# Patient Record
Sex: MALE | Race: WHITE | NOT HISPANIC OR LATINO
[De-identification: names, ages, dates, MRNs, and addresses within clinical notes are randomized per-mention and may not be internally consistent; named-entity substitution may affect disease eponyms.]

---
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
## 2020-01-07 ENCOUNTER — APPOINTMENT (OUTPATIENT)
Dept: PEDIATRIC ADOLESCENT MEDICINE | Facility: CLINIC | Age: 20
End: 2020-01-07
Payer: COMMERCIAL

## 2020-01-07 PROCEDURE — 90471 IMMUNIZATION ADMIN: CPT

## 2020-01-07 PROCEDURE — 90472 IMMUNIZATION ADMIN EACH ADD: CPT

## 2020-01-07 PROCEDURE — 90651 9VHPV VACCINE 2/3 DOSE IM: CPT

## 2020-01-07 PROCEDURE — 90632 HEPA VACCINE ADULT IM: CPT

## 2020-01-07 NOTE — HISTORY OF PRESENT ILLNESS
[FreeTextEntry6] : Vicente is a 20 year old transmale here for vaccine update.\par \par Vaccines due today: HepA#2, HPV#3, flu and Bexero #1\par Vicente requested to receive only 2/4 vaccines today. Declined flu (he said he will get at local pharmacy) and Bexero\par \par Tolerated vaccines well\par Denies recent fever or illness

## 2020-01-14 ENCOUNTER — APPOINTMENT (OUTPATIENT)
Dept: PLASTIC SURGERY | Facility: CLINIC | Age: 20
End: 2020-01-14
Payer: COMMERCIAL

## 2020-01-14 PROCEDURE — 99213 OFFICE O/P EST LOW 20 MIN: CPT

## 2020-01-14 NOTE — HISTORY OF PRESENT ILLNESS
[FreeTextEntry1] : s/p revision of bilateral reconstructed breast levels left and right side DOS 12/14/18 post FTM mastectomy (transgender pt)\par Pt is doing better w/time.\par Here for follow up.

## 2020-01-21 DIAGNOSIS — Z23 ENCOUNTER FOR IMMUNIZATION: ICD-10-CM

## 2020-06-03 LAB
25(OH)D3 SERPL-MCNC: 36.1 NG/ML
ALBUMIN SERPL ELPH-MCNC: 4.7 G/DL
ALP BLD-CCNC: 98 U/L
ALT SERPL-CCNC: 14 U/L
ANION GAP SERPL CALC-SCNC: 14 MMOL/L
AST SERPL-CCNC: 18 U/L
BASOPHILS # BLD AUTO: 0.03 K/UL
BASOPHILS NFR BLD AUTO: 0.5 %
BILIRUB SERPL-MCNC: 0.4 MG/DL
BUN SERPL-MCNC: 12 MG/DL
CALCIUM SERPL-MCNC: 9.5 MG/DL
CHLORIDE SERPL-SCNC: 102 MMOL/L
CHOLEST SERPL-MCNC: 163 MG/DL
CHOLEST/HDLC SERPL: 4 RATIO
CO2 SERPL-SCNC: 26 MMOL/L
CREAT SERPL-MCNC: 1.18 MG/DL
EOSINOPHIL # BLD AUTO: 0.11 K/UL
EOSINOPHIL NFR BLD AUTO: 1.7 %
ESTRADIOL SERPL-MCNC: 62 PG/ML
GLUCOSE SERPL-MCNC: 95 MG/DL
HCT VFR BLD CALC: 50.5 %
HDLC SERPL-MCNC: 41 MG/DL
HGB BLD-MCNC: 16.1 G/DL
IMM GRANULOCYTES NFR BLD AUTO: 0.3 %
LDLC SERPL CALC-MCNC: 99 MG/DL
LYMPHOCYTES # BLD AUTO: 2.02 K/UL
LYMPHOCYTES NFR BLD AUTO: 30.9 %
MAN DIFF?: NORMAL
MCHC RBC-ENTMCNC: 28.9 PG
MCHC RBC-ENTMCNC: 31.9 GM/DL
MCV RBC AUTO: 90.5 FL
MONOCYTES # BLD AUTO: 0.8 K/UL
MONOCYTES NFR BLD AUTO: 12.2 %
NEUTROPHILS # BLD AUTO: 3.56 K/UL
NEUTROPHILS NFR BLD AUTO: 54.4 %
PLATELET # BLD AUTO: 288 K/UL
POTASSIUM SERPL-SCNC: 4.2 MMOL/L
PROT SERPL-MCNC: 6.9 G/DL
RBC # BLD: 5.58 M/UL
RBC # FLD: 12.1 %
SODIUM SERPL-SCNC: 141 MMOL/L
TESTOST SERPL-MCNC: 684 NG/DL
TRIGL SERPL-MCNC: 118 MG/DL
WBC # FLD AUTO: 6.54 K/UL

## 2020-06-05 ENCOUNTER — APPOINTMENT (OUTPATIENT)
Dept: PEDIATRIC ENDOCRINOLOGY | Facility: CLINIC | Age: 20
End: 2020-06-05
Payer: COMMERCIAL

## 2020-06-05 PROCEDURE — 99214 OFFICE O/P EST MOD 30 MIN: CPT | Mod: 95

## 2020-06-15 NOTE — HISTORY OF PRESENT ILLNESS
[Home] : at home, [unfilled] , at the time of the visit. [Medical Office: ([facility])___] : at the medical office located in  [Verbal consent obtained from patient] : the patient, [unfilled] [Polyuria] : no polyuria [Headaches] : no headaches [Polydipsia] : no polydipsia [Abdominal Pain] : no abdominal pain [Constipation] : no constipation [Fatigue] : no fatigue [Vomiting] : no vomiting [FreeTextEntry2] : Vicente is a now 20 year 11 month old transgender male who presents today for follow-up on cross sex hormone treatment with testosterone who is s/p top surgery. He has been followed by psychiatrist Dr. Greenfield and therapist Ms. Samreen Corea. I met him for the first time 7/5/2017 for a discussion. He returned 1/10/18 after he was cleared to start cross sex hormone treatment. Shot 1/25/18, gel since February 2018, and on 3 pumps daily since August 2018. He was seen again in 4/2/2019 for follow-up when he switched to injections. He was started on testosterone 100 mg every 2 weeks and dosage has been adjusted based on results. . \par Before his 12/17/2020 we increased again to 130 mg every 2 weeks of testosterone. Results then showed testosterone was still suboptimal, we increased up to 140 mg every 2 weeks\par \par Today he states he has been consistent with testosterone 140 mg every 2 weeks. He stated school year was not bad. 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 Back in Late March school closed to be remote learning and he came home. he has video calls at same time as classes are supposed to be, school year is now over. He states that this method is "not the best but not horrible". He was able to focus and finish up the school year. \par He states nothing is bothering him. He has not been shaving as much, likes how it looks. He may shave it if it becomes itchy / uncomfortable. \par Last visit he had concerns about his voice, I asked how he felt recently. He is still not comfortable, but recently when he recorded a message he realized how deep his voice sounded. If his voice does sound so deep to everyone he does think it is adequate. \par He denies any concerns. He has not had any issues with cramping or spotting. \par He is not sure about plans for school opening in the fall, he is hoping to go back physically to college. \par He states that he is not getting as much exercise for home, has been going for walks around the block, trying to be more cautious with his intake. \par \par Blood test results 6/2/2020:\par Testosterone	684.0 ng/dL		\par Triglyceride	118 mg/dL		\par Cholesterol	163 mg/dL		\par HDL Cholesterol	41 mg/dL		>=40\par LDL Cholesterol (Calculated)	99 mg/dL		<=100\par \par WBC	6.54 K/uL		3.80-10.50\par RBC Count	5.58 M/uL		4.20-5.80\par HGB	16.1 g/dL		13.0-17.0\par HCT	50.5 %	H	39.0-50.0\par Mean Cell Volume	90.5 fl		80.0-100.0\par Mean Cell Hemoglobin	28.9 pg		27.0-34.0\par Mean Cell Hemoglobin Conc	31.9 gm/dL	L	32.0-36.0\par Red Cell Distrib Width	12.1 %		10.3-14.5\par PLT	288 K/uL		150-400\par Neutrophil #	3.56 K/uL		1.80-7.40\par Lymphocyte #	2.02 K/uL		1.00-3.30\par Monocyte #	0.80 K/uL		0.00-0.90\par Eosinophil #	0.11 K/uL		0.00-0.50\par Basophil #	0.03 K/uL		0.00-0.20\par Neutrophil %	54.4 %		43.0-77.0\par Differential percentages must be correlated with absolute numbers for clinical significance.\par Lymphocyte %	30.9 %		13.0-44.0\par Monocyte %	12.2 %		2.0-14.0\par Eosinophil %	1.7 %		0.0-6.0\par Basophil %	0.5 %		0.0-2.0\par Auto Immature Granulocyte %	0.3 %		0.0-1.5\par MAN DIFF?	N/A		No\par Resulted by Discern\par \par Test Name	Result	Flag	Reference\par Sodium	141 mmol/L		135-145\par Potassium	4.2 mmol/L		3.5-5.3\par Chloride	102 mmol/L		\par CO2	26 mmol/L		22-31\par Anion Gap, Serum	14 mmol/L		5-17\par Glucose	95 mg/dL		70-99\par BUN	12 mg/dL		7-23\par Creatinine	1.18 mg/dL		0.50-1.30\par Total Protein	6.9 g/dL		6.0-8.3\par Albumin	4.7 g/dL		3.3-5.0\par Calcium, Serum	9.5 mg/dL		8.4-10.5\par Total  Bilirubin	0.4 mg/dL		0.2-1.2\par AST (SGOT)	18 U/L		10-40\par ALT(SGPT)	14 U/L		10-45\par ALK PHOS	98 U/L		\par Estradiol	62 pg/mL	H	11-43\par Vitamin D 25 Hydroxy	36.1 ng/mL		30.0-80.0\par

## 2020-06-15 NOTE — CONSULT LETTER
[Dear  ___] : Dear  [unfilled], [Courtesy Letter:] : I had the pleasure of seeing your patient, [unfilled], in my office today. [Please see my note below.] : Please see my note below. [Consult Closing:] : Thank you very much for allowing me to participate in the care of this patient.  If you have any questions, please do not hesitate to contact me. [Sincerely,] : Sincerely, [DrAsh ___] : Dr. LANDERS [FreeTextEntry3] : YeouChing Hsu, MD \par Division of Pediatric Endocrinology \par [facility] \par  of Pediatrics \par [facility] [employer] [facility] at [facility]

## 2020-06-15 NOTE — PHYSICAL EXAM
[Well Nourished] : well nourished [Obese] : obese [Interactive] : interactive [Normal] : normal [Normally Set] : normally set [de-identified] : short hair, deep voice

## 2020-07-28 ENCOUNTER — APPOINTMENT (OUTPATIENT)
Dept: PEDIATRIC ENDOCRINOLOGY | Facility: CLINIC | Age: 20
End: 2020-07-28
Payer: COMMERCIAL

## 2020-07-28 PROCEDURE — 99441: CPT

## 2020-08-05 ENCOUNTER — RX RENEWAL (OUTPATIENT)
Age: 20
End: 2020-08-05

## 2020-09-18 ENCOUNTER — APPOINTMENT (OUTPATIENT)
Dept: PEDIATRIC ENDOCRINOLOGY | Facility: CLINIC | Age: 20
End: 2020-09-18
Payer: COMMERCIAL

## 2020-09-18 DIAGNOSIS — N92.6 IRREGULAR MENSTRUATION, UNSPECIFIED: ICD-10-CM

## 2020-09-18 PROCEDURE — 99442: CPT

## 2020-09-21 PROBLEM — N92.6 IRREGULAR MENSES: Status: ACTIVE | Noted: 2020-07-29

## 2020-10-09 ENCOUNTER — APPOINTMENT (OUTPATIENT)
Dept: PEDIATRIC ENDOCRINOLOGY | Facility: CLINIC | Age: 20
End: 2020-10-09

## 2020-10-14 ENCOUNTER — APPOINTMENT (OUTPATIENT)
Dept: OBGYN | Facility: CLINIC | Age: 20
End: 2020-10-14
Payer: COMMERCIAL

## 2020-10-14 VITALS — DIASTOLIC BLOOD PRESSURE: 70 MMHG | SYSTOLIC BLOOD PRESSURE: 106 MMHG | WEIGHT: 206 LBS

## 2020-10-14 PROCEDURE — 76857 US EXAM PELVIC LIMITED: CPT

## 2020-10-14 PROCEDURE — 99213 OFFICE O/P EST LOW 20 MIN: CPT | Mod: 25

## 2020-10-16 LAB
C TRACH RRNA SPEC QL NAA+PROBE: NOT DETECTED
CANDIDA VAG CYTO: NOT DETECTED
G VAGINALIS+PREV SP MTYP VAG QL MICRO: NOT DETECTED
N GONORRHOEA RRNA SPEC QL NAA+PROBE: NOT DETECTED
SOURCE AMPLIFICATION: NORMAL
T VAGINALIS VAG QL WET PREP: NOT DETECTED

## 2020-10-20 ENCOUNTER — OUTPATIENT (OUTPATIENT)
Dept: OUTPATIENT SERVICES | Facility: HOSPITAL | Age: 20
LOS: 1 days | End: 2020-10-20
Payer: COMMERCIAL

## 2020-10-20 ENCOUNTER — LABORATORY RESULT (OUTPATIENT)
Age: 20
End: 2020-10-20

## 2020-10-20 ENCOUNTER — APPOINTMENT (OUTPATIENT)
Dept: ULTRASOUND IMAGING | Facility: CLINIC | Age: 20
End: 2020-10-20
Payer: COMMERCIAL

## 2020-10-20 DIAGNOSIS — Z00.8 ENCOUNTER FOR OTHER GENERAL EXAMINATION: ICD-10-CM

## 2020-10-20 DIAGNOSIS — Z90.13 ACQUIRED ABSENCE OF BILATERAL BREASTS AND NIPPLES: Chronic | ICD-10-CM

## 2020-10-20 PROCEDURE — 76830 TRANSVAGINAL US NON-OB: CPT

## 2020-10-20 PROCEDURE — 76856 US EXAM PELVIC COMPLETE: CPT | Mod: 26

## 2020-10-20 PROCEDURE — 76856 US EXAM PELVIC COMPLETE: CPT

## 2020-10-20 PROCEDURE — 76830 TRANSVAGINAL US NON-OB: CPT | Mod: 26,KX

## 2020-10-21 LAB — PROLACTIN SERPL-MCNC: 11 NG/ML

## 2020-10-23 ENCOUNTER — NON-APPOINTMENT (OUTPATIENT)
Age: 20
End: 2020-10-23

## 2020-10-23 LAB — TSH SERPL-ACNC: 0.01 UIU/ML

## 2020-10-27 ENCOUNTER — APPOINTMENT (OUTPATIENT)
Dept: PEDIATRIC ADOLESCENT MEDICINE | Facility: CLINIC | Age: 20
End: 2020-10-27
Payer: COMMERCIAL

## 2020-10-27 ENCOUNTER — APPOINTMENT (OUTPATIENT)
Dept: PEDIATRIC ENDOCRINOLOGY | Facility: CLINIC | Age: 20
End: 2020-10-27
Payer: COMMERCIAL

## 2020-10-27 VITALS
TEMPERATURE: 97.6 F | DIASTOLIC BLOOD PRESSURE: 80 MMHG | BODY MASS INDEX: 32.1 KG/M2 | HEART RATE: 73 BPM | WEIGHT: 192.68 LBS | HEIGHT: 64.88 IN | SYSTOLIC BLOOD PRESSURE: 114 MMHG

## 2020-10-27 VITALS — DIASTOLIC BLOOD PRESSURE: 60 MMHG | SYSTOLIC BLOOD PRESSURE: 123 MMHG | HEART RATE: 59 BPM

## 2020-10-27 DIAGNOSIS — Z23 ENCOUNTER FOR IMMUNIZATION: ICD-10-CM

## 2020-10-27 LAB
BASOPHILS # BLD AUTO: 0.02 K/UL
BASOPHILS NFR BLD AUTO: 0.3 %
EOSINOPHIL # BLD AUTO: 0.09 K/UL
EOSINOPHIL NFR BLD AUTO: 1.3 %
ESTRADIOL SERPL-MCNC: 56 PG/ML
HCT VFR BLD CALC: 48.6 %
HGB BLD-MCNC: 15.5 G/DL
IMM GRANULOCYTES NFR BLD AUTO: 0.1 %
LH SERPL-ACNC: 1.3 IU/L
LYMPHOCYTES # BLD AUTO: 2.22 K/UL
LYMPHOCYTES NFR BLD AUTO: 32.6 %
MAN DIFF?: NORMAL
MCHC RBC-ENTMCNC: 29.7 PG
MCHC RBC-ENTMCNC: 31.9 GM/DL
MCV RBC AUTO: 93.1 FL
MONOCYTES # BLD AUTO: 0.79 K/UL
MONOCYTES NFR BLD AUTO: 11.6 %
NEUTROPHILS # BLD AUTO: 3.69 K/UL
NEUTROPHILS NFR BLD AUTO: 54.1 %
PLATELET # BLD AUTO: 287 K/UL
RBC # BLD: 5.22 M/UL
RBC # FLD: 12.2 %
T3 SERPL-MCNC: 133 NG/DL
T4 SERPL-MCNC: 6.5 UG/DL
TESTOST SERPL-MCNC: 576 NG/DL
THYROGLOB AB SERPL-ACNC: <20 IU/ML
THYROPEROXIDASE AB SERPL IA-ACNC: <10 IU/ML
TSH SERPL-ACNC: 1.26 UIU/ML
WBC # FLD AUTO: 6.82 K/UL

## 2020-10-27 PROCEDURE — 90686 IIV4 VACC NO PRSV 0.5 ML IM: CPT

## 2020-10-27 PROCEDURE — 99072 ADDL SUPL MATRL&STAF TM PHE: CPT

## 2020-10-27 PROCEDURE — 99214 OFFICE O/P EST MOD 30 MIN: CPT

## 2020-10-27 PROCEDURE — 90471 IMMUNIZATION ADMIN: CPT

## 2020-11-02 DIAGNOSIS — R94.6 ABNORMAL RESULTS OF THYROID FUNCTION STUDIES: ICD-10-CM

## 2020-11-02 LAB
TSH RECEPTOR AB: <1.1 IU/L
TSI ACT/NOR SER: <0.1 IU/L

## 2020-11-24 ENCOUNTER — APPOINTMENT (OUTPATIENT)
Dept: ULTRASOUND IMAGING | Facility: CLINIC | Age: 20
End: 2020-11-24
Payer: COMMERCIAL

## 2020-11-24 ENCOUNTER — OUTPATIENT (OUTPATIENT)
Dept: OUTPATIENT SERVICES | Facility: HOSPITAL | Age: 20
LOS: 1 days | End: 2020-11-24
Payer: COMMERCIAL

## 2020-11-24 DIAGNOSIS — Z90.13 ACQUIRED ABSENCE OF BILATERAL BREASTS AND NIPPLES: Chronic | ICD-10-CM

## 2020-11-24 DIAGNOSIS — Z00.8 ENCOUNTER FOR OTHER GENERAL EXAMINATION: ICD-10-CM

## 2020-11-24 PROCEDURE — 76856 US EXAM PELVIC COMPLETE: CPT

## 2020-11-24 PROCEDURE — 76856 US EXAM PELVIC COMPLETE: CPT | Mod: 26,KX

## 2020-11-24 PROCEDURE — 76830 TRANSVAGINAL US NON-OB: CPT | Mod: 26,KX

## 2020-11-24 PROCEDURE — 76830 TRANSVAGINAL US NON-OB: CPT

## 2020-11-30 ENCOUNTER — NON-APPOINTMENT (OUTPATIENT)
Age: 20
End: 2020-11-30

## 2020-12-01 ENCOUNTER — APPOINTMENT (OUTPATIENT)
Dept: OBGYN | Facility: CLINIC | Age: 20
End: 2020-12-01
Payer: COMMERCIAL

## 2020-12-01 LAB
ESTRADIOL SERPL-MCNC: 30 PG/ML
LH SERPL-ACNC: 3.2 IU/L
TESTOST SERPL-MCNC: 385 NG/DL

## 2020-12-01 PROCEDURE — 99212 OFFICE O/P EST SF 10 MIN: CPT | Mod: 95

## 2020-12-03 DIAGNOSIS — Z20.828 CONTACT WITH AND (SUSPECTED) EXPOSURE TO OTHER VIRAL COMMUNICABLE DISEASES: ICD-10-CM

## 2020-12-07 LAB — SARS-COV-2 N GENE NPH QL NAA+PROBE: NOT DETECTED

## 2020-12-11 ENCOUNTER — APPOINTMENT (OUTPATIENT)
Dept: OBGYN | Facility: CLINIC | Age: 20
End: 2020-12-11
Payer: COMMERCIAL

## 2020-12-11 VITALS
DIASTOLIC BLOOD PRESSURE: 80 MMHG | HEIGHT: 64.88 IN | SYSTOLIC BLOOD PRESSURE: 116 MMHG | WEIGHT: 190 LBS | BODY MASS INDEX: 31.65 KG/M2

## 2020-12-11 DIAGNOSIS — Z30.430 ENCOUNTER FOR INSERTION OF INTRAUTERINE CONTRACEPTIVE DEVICE: ICD-10-CM

## 2020-12-11 DIAGNOSIS — Z30.09 ENCOUNTER FOR OTHER GENERAL COUNSELING AND ADVICE ON CONTRACEPTION: ICD-10-CM

## 2020-12-11 LAB — HCG UR QL: NEGATIVE

## 2020-12-11 PROCEDURE — 99213 OFFICE O/P EST LOW 20 MIN: CPT | Mod: 25,KX

## 2020-12-11 PROCEDURE — 99072 ADDL SUPL MATRL&STAF TM PHE: CPT

## 2020-12-11 PROCEDURE — 58300 INSERT INTRAUTERINE DEVICE: CPT

## 2020-12-11 PROCEDURE — 76857 US EXAM PELVIC LIMITED: CPT

## 2020-12-11 PROCEDURE — 81025 URINE PREGNANCY TEST: CPT

## 2020-12-13 LAB
C TRACH RRNA SPEC QL NAA+PROBE: NOT DETECTED
N GONORRHOEA RRNA SPEC QL NAA+PROBE: NOT DETECTED
SOURCE AMPLIFICATION: NORMAL

## 2020-12-14 RX ORDER — SYRINGE, DISPOSABLE, 1 ML
1 ML SYRINGE, EMPTY DISPOSABLE MISCELLANEOUS
Qty: 15 | Refills: 3 | Status: DISCONTINUED | COMMUNITY
Start: 2020-11-17 | End: 2020-12-14

## 2021-01-25 ENCOUNTER — APPOINTMENT (OUTPATIENT)
Dept: OBGYN | Facility: CLINIC | Age: 21
End: 2021-01-25
Payer: COMMERCIAL

## 2021-01-25 VITALS
WEIGHT: 194 LBS | DIASTOLIC BLOOD PRESSURE: 77 MMHG | TEMPERATURE: 97.1 F | HEIGHT: 64.88 IN | SYSTOLIC BLOOD PRESSURE: 117 MMHG | BODY MASS INDEX: 32.32 KG/M2

## 2021-01-25 DIAGNOSIS — Z01.419 ENCOUNTER FOR GYNECOLOGICAL EXAMINATION (GENERAL) (ROUTINE) W/OUT ABNORMAL FINDINGS: ICD-10-CM

## 2021-01-25 DIAGNOSIS — Z30.431 ENCOUNTER FOR ROUTINE CHECKING OF INTRAUTERINE CONTRACEPTIVE DEVICE: ICD-10-CM

## 2021-01-25 PROCEDURE — 99072 ADDL SUPL MATRL&STAF TM PHE: CPT

## 2021-01-25 PROCEDURE — 99395 PREV VISIT EST AGE 18-39: CPT | Mod: KX

## 2021-01-25 NOTE — PROCEDURE
[Locate IUD] : locate IUD [Transvaginal Ultrasound] : transvaginal ultrasound [Anteverted] : anteverted [FreeTextEntry3] : IUS in lower uterine segment, not in cervix

## 2021-01-25 NOTE — PHYSICAL EXAM
[Appropriately responsive] : appropriately responsive [Alert] : alert [No Lymphadenopathy] : no lymphadenopathy [No Acute Distress] : no acute distress [Regular Rate Rhythm] : regular rate rhythm [No Murmurs] : no murmurs [Clear to Auscultation B/L] : clear to auscultation bilaterally [Soft] : soft [Non-tender] : non-tender [Non-distended] : non-distended [No HSM] : No HSM [Oriented x3] : oriented x3 [FreeTextEntry6] : s/p top surgery -- mastectomy scar b/l [No Discharge] : no discharge [No Masses] : no breast masses were palpable [Labia Majora] : normal [Labia Minora] : normal [Enlarged] : enlarged [Atrophy] : atrophy [No Bleeding] : There was no active vaginal bleeding [IUD String] : an IUD string was noted [Normal] : normal [Anteversion] : anteverted [FreeTextEntry5] : IUS strings: 2 cm

## 2021-01-25 NOTE — HISTORY OF PRESENT ILLNESS
[FreeTextEntry1] : 20 yo G0 AFAB trans male presents for annual appointment and LNG-IUS check.  Since insertion he reports less bleeding. He is taking progesterone 2x a day.  last episode bleeding - yesterday night. \par He has had 2 doses of gardasil.  (started at age >15) \par \par remote learning\par [employer]\par painting/drawing\par \par MD - dr. jewell had appt 2/20\par endo - [facility]\par therapist - dahiana rodriguez every 2 weeks\par psych - sancho chaidez\par \par lives at home w mom, sister\par \par no sexual activity this past year

## 2021-01-25 NOTE — REVIEW OF SYSTEMS
[Fever] : no fever [Chills] : no chills [Sight Problems] : no sight problems [Dyspnea] : no dyspnea [Chest Pain] : no chest pain [Incontinence] : no incontinence [Pelvic pain] : no pelvic pain [Breast Pain] : no breast pain [Breast Lump] : no breast lump [Skin Rash] : no skin rash [Mole Changes] : no mole changes [Arthralgias] : no arthralgias [Headache] : no headache [Anxiety] : no anxiety [Depression] : no depression [Hot Flashes] : no hot flashes [Easy Bleeding] : no easy bleeding [Easy Bruising] : no easy bruising [de-identified] : stable anxiety/depression

## 2021-05-01 ENCOUNTER — NON-APPOINTMENT (OUTPATIENT)
Age: 21
End: 2021-05-01

## 2021-05-03 ENCOUNTER — NON-APPOINTMENT (OUTPATIENT)
Age: 21
End: 2021-05-03

## 2021-05-04 ENCOUNTER — APPOINTMENT (OUTPATIENT)
Dept: OBGYN | Facility: CLINIC | Age: 21
End: 2021-05-04
Payer: COMMERCIAL

## 2021-05-04 VITALS
BODY MASS INDEX: 30.32 KG/M2 | WEIGHT: 182 LBS | HEIGHT: 64.88 IN | SYSTOLIC BLOOD PRESSURE: 106 MMHG | DIASTOLIC BLOOD PRESSURE: 68 MMHG

## 2021-05-04 DIAGNOSIS — T83.9XXA UNSPECIFIED COMPLICATION OF GENITOURINARY PROSTHETIC DEVICE, IMPLANT AND GRAFT, INITIAL ENCOUNTER: ICD-10-CM

## 2021-05-04 PROCEDURE — 99214 OFFICE O/P EST MOD 30 MIN: CPT | Mod: 25

## 2021-05-04 PROCEDURE — 99072 ADDL SUPL MATRL&STAF TM PHE: CPT

## 2021-05-04 PROCEDURE — 58301 REMOVE INTRAUTERINE DEVICE: CPT

## 2021-05-04 PROCEDURE — 76830 TRANSVAGINAL US NON-OB: CPT

## 2021-05-18 ENCOUNTER — APPOINTMENT (OUTPATIENT)
Dept: OBGYN | Facility: CLINIC | Age: 21
End: 2021-05-18
Payer: COMMERCIAL

## 2021-05-18 PROCEDURE — 99072 ADDL SUPL MATRL&STAF TM PHE: CPT

## 2021-05-18 PROCEDURE — 99203 OFFICE O/P NEW LOW 30 MIN: CPT

## 2021-05-26 LAB
25(OH)D3 SERPL-MCNC: 31.8 NG/ML
ALBUMIN SERPL ELPH-MCNC: 4.8 G/DL
ALP BLD-CCNC: 84 U/L
ALT SERPL-CCNC: 13 U/L
ANION GAP SERPL CALC-SCNC: 12 MMOL/L
AST SERPL-CCNC: 15 U/L
BASOPHILS # BLD AUTO: 0.02 K/UL
BASOPHILS NFR BLD AUTO: 0.4 %
BILIRUB SERPL-MCNC: 0.7 MG/DL
BUN SERPL-MCNC: 12 MG/DL
CALCIUM SERPL-MCNC: 9.6 MG/DL
CHLORIDE SERPL-SCNC: 105 MMOL/L
CHOLEST SERPL-MCNC: 173 MG/DL
CO2 SERPL-SCNC: 26 MMOL/L
CREAT SERPL-MCNC: 1.06 MG/DL
EOSINOPHIL # BLD AUTO: 0.09 K/UL
EOSINOPHIL NFR BLD AUTO: 1.6 %
ESTIMATED AVERAGE GLUCOSE: 105 MG/DL
ESTRADIOL SERPL-MCNC: 27 PG/ML
GLUCOSE SERPL-MCNC: 91 MG/DL
HBA1C MFR BLD HPLC: 5.3 %
HCT VFR BLD CALC: 44.8 %
HDLC SERPL-MCNC: 49 MG/DL
HGB BLD-MCNC: 15.2 G/DL
IMM GRANULOCYTES NFR BLD AUTO: 0.2 %
LDLC SERPL CALC-MCNC: 111 MG/DL
LH SERPL-ACNC: 0.4 IU/L
LYMPHOCYTES # BLD AUTO: 1.58 K/UL
LYMPHOCYTES NFR BLD AUTO: 28.6 %
MAN DIFF?: NORMAL
MCHC RBC-ENTMCNC: 30.2 PG
MCHC RBC-ENTMCNC: 33.9 GM/DL
MCV RBC AUTO: 89.1 FL
MONOCYTES # BLD AUTO: 0.54 K/UL
MONOCYTES NFR BLD AUTO: 9.8 %
NEUTROPHILS # BLD AUTO: 3.28 K/UL
NEUTROPHILS NFR BLD AUTO: 59.4 %
NONHDLC SERPL-MCNC: 124 MG/DL
PLATELET # BLD AUTO: 271 K/UL
POTASSIUM SERPL-SCNC: 4.4 MMOL/L
PROT SERPL-MCNC: 6.7 G/DL
RBC # BLD: 5.03 M/UL
RBC # FLD: 11.9 %
SODIUM SERPL-SCNC: 143 MMOL/L
TESTOST SERPL-MCNC: 389 NG/DL
TRIGL SERPL-MCNC: 65 MG/DL
TSH SERPL-ACNC: 1.12 UIU/ML
WBC # FLD AUTO: 5.52 K/UL

## 2021-06-02 ENCOUNTER — APPOINTMENT (OUTPATIENT)
Dept: PEDIATRIC ENDOCRINOLOGY | Facility: CLINIC | Age: 21
End: 2021-06-02
Payer: COMMERCIAL

## 2021-06-02 VITALS
WEIGHT: 189.38 LBS | HEART RATE: 76 BPM | BODY MASS INDEX: 31.94 KG/M2 | DIASTOLIC BLOOD PRESSURE: 75 MMHG | TEMPERATURE: 98 F | HEIGHT: 64.76 IN | SYSTOLIC BLOOD PRESSURE: 121 MMHG

## 2021-06-02 PROCEDURE — 99215 OFFICE O/P EST HI 40 MIN: CPT

## 2021-06-02 PROCEDURE — 99072 ADDL SUPL MATRL&STAF TM PHE: CPT

## 2021-06-02 RX ORDER — DULOXETINE HYDROCHLORIDE 40 MG/1
40 CAPSULE, DELAYED RELEASE PELLETS ORAL
Qty: 30 | Refills: 0 | Status: DISCONTINUED | COMMUNITY
Start: 2020-12-02

## 2021-06-02 RX ORDER — SERTRALINE 25 MG/1
25 TABLET, FILM COATED ORAL
Qty: 30 | Refills: 0 | Status: DISCONTINUED | COMMUNITY
Start: 2021-03-17

## 2021-06-02 RX ORDER — BUPROPION HYDROCHLORIDE 100 MG/1
TABLET, FILM COATED ORAL
Refills: 0 | Status: ACTIVE | COMMUNITY

## 2021-06-02 RX ORDER — MEDROXYPROGESTERONE ACETATE 10 MG/1
10 TABLET ORAL
Qty: 60 | Refills: 3 | Status: DISCONTINUED | COMMUNITY
Start: 2020-07-28 | End: 2021-06-02

## 2021-06-02 RX ORDER — DULOXETINE HYDROCHLORIDE 30 MG/1
CAPSULE, DELAYED RELEASE ORAL
Refills: 0 | Status: DISCONTINUED | COMMUNITY
End: 2021-03-02

## 2021-06-02 RX ORDER — SYRINGE WITH NEEDLE, 1 ML 25GX5/8"
23G X 1" SYRINGE, EMPTY DISPOSABLE MISCELLANEOUS
Qty: 5 | Refills: 0 | Status: DISCONTINUED | COMMUNITY
Start: 2020-11-17

## 2021-06-02 NOTE — PHYSICAL EXAM
[Well Nourished] : well nourished [Interactive] : interactive [Obese] : obese [Normal] : normal [Normally Set] : normally set [de-identified] : short hair, deep voice

## 2021-06-02 NOTE — HISTORY OF PRESENT ILLNESS
[Home] : at home, [unfilled] , at the time of the visit. [Medical Office: ([facility])___] : at the medical office located in  [Verbal consent obtained from patient] : the patient, [unfilled] [Headaches] : no headaches [Polyuria] : no polyuria [Polydipsia] : no polydipsia [Constipation] : no constipation [Fatigue] : no fatigue [Abdominal Pain] : no abdominal pain [Vomiting] : no vomiting [FreeTextEntry2] : Vicente is a now 20 year 9 month old transgender male who presents today for follow-up on cross sex hormone treatment with testosterone who is s/p top surgery and recently has had issues with irregular uterine bleeding. He has been followed by psychiatrist Dr. Greenfield and therapist Ms. Samreen Corea. I met him for the first time 7/5/2017 for a discussion he decided not to opt for hormone suppression treatment. He returned 1/10/18 after he was cleared to start cross sex hormone treatment. Shot 1/25/18, gel since February 2018 worked up to full dosage. He was seen again in 4/2/2019 for follow-up when he switched to injections. He was started on testosterone 100 mg every 2 weeks and dosage has been adjusted based on results. . \par Before his 12/17/2020 we increased again to 130 mg every 2 weeks of testosterone. Results then showed testosterone was still suboptimal, we increased up to 140 mg every 2 weeks. \par I last saw him 6/5/2020 for telehealth follow-up the second half of the semester was remote which was not good but not terrible either. He was hoping for live school in the fall. He states he was able to focus better after and finished up the school year. Results showed that unfortunately estradiol was again on the higher side in the 60’s thus we recommended decreasing back to 130 mg every 2 weeks. \par Unfortunately since then he has had very irregular bleeding he first contacted me 7/28/2020 he has had light spotting since 3 weeks before, first I recommended trying to do 70 mg every week of testosterone. I recommended also starting Provera 10 mg for 1 week. With this, menses unfortunately started August 7th and has not stopped when he contacted me 8/26/2020. The amount is not a large amount but he changed pads 2-3 times a day. I recommended restarting Provera again for 1 week and then he felt bleeding was almost gone I recommended lower to 1/2 tablet. When he contacted me 9/18 unfortunately it had gotten heavier. Trying to determine what may be more of an issue, potentially due to poor suppression of HPG axis, I increased him to 1 tablet of Provera every day and also 75 mg every week of testosterone. With this unfortunately it has not stopped, thus we increased to twice a day of Provera since 9/30/2020. This visit is plan for follow up and also to follow-up on the menses. \par We have discussed combined OCP which he does not want to do, and we have also reviewed possibility of considering IUD. \par He did see Dr. Martins 10/14/2020 last week and had blood work and U/S done. With results TSH was unexpectedly very low. He does not take testosterone at the same time of the day she recommended trying to be consistent with it as well. As I was requesting results to be done over the weekend I added thyroid studies and antibodies to check for autoimmune thyroid disease as well as Graves’ disease.\par Today Vicente states that physically, he has been okay. Since progesterone was increased to twice a day the bleeding is still there and it is on the light side, but still coming regularly. He is doing testosterone every Tuesday never missed / a day late but may be afternoon vs. night time. He is actually working 35 hours a week at [employer] right now. It has not been stressful for the season, there is a transfemale at work there as well. \par Emotionally he felt this has been a big toll on him. He has been talking with his therapist Ms. Samreen Corea as before and continues to work well with her, his psychiatrist is still Dr. Greenfield. He is consistent with his medications including his vitamin D. \par He has lost about 15 lbs in the last 2 months because he does not drive so he has been walking to and from work if not dropped off by someone. He is also not snacking as much. He is happy about the weight loss knows he has not weighed this much for a long time. \par I commented his voice is nice a deep and he states particularly on the phone he does try to use deeper voice. He is shaving regularly.

## 2021-06-02 NOTE — CONSULT LETTER
[Dear  ___] : Dear  [unfilled], [Courtesy Letter:] : I had the pleasure of seeing your patient, [unfilled], in my office today. [Please see my note below.] : Please see my note below. [Consult Closing:] : Thank you very much for allowing me to participate in the care of this patient.  If you have any questions, please do not hesitate to contact me. [Sincerely,] : Sincerely, [DrAsh ___] : Dr. LANDERS [DrAsh  ___] : Dr. LANDERS [FreeTextEntry3] : YeouChing Hsu, MD \par Division of Pediatric Endocrinology \par [facility] \par  of Pediatrics \par [facility] [employer] [facility]

## 2021-06-09 NOTE — HISTORY OF PRESENT ILLNESS
[Headaches] : no headaches [Polyuria] : no polyuria [Polydipsia] : no polydipsia [Constipation] : no constipation [Fatigue] : no fatigue [Abdominal Pain] : no abdominal pain [Vomiting] : no vomiting [FreeTextEntry2] : Vicente is a now 21 year 4 month old transgender male who presents today for follow-up on cross sex hormone treatment with testosterone who is s/p top surgery and recently has had issues with irregular uterine bleeding. He has been followed by psychiatrist Dr. Greenfield and therapist Ms. Samreen Corea. I met him for the first time 7/5/2017 for a discussion he decided not to opt for hormone suppression treatment. He returned 1/10/18 after he was cleared to start cross sex hormone treatment. Shot 1/25/18, gel since February 2018 worked up to full dosage. He was seen again in 4/2/2019 for follow-up when he switched to injections. He was started on testosterone 100 mg every 2 weeks and dosage has been adjusted based on results. . \par Before his 12/17/2020 we increased again to 130 mg every 2 weeks of testosterone. Results then showed testosterone was still suboptimal, we increased up to 140 mg every 2 weeks. \par I last saw him 6/5/2020 for telehealth follow-up the second half of the semester was remote which was not good but not terrible either. He was hoping for live school in the fall. He states he was able to focus better after and finished up the school year. Results showed that unfortunately estradiol was again on the higher side in the 60’s thus we recommended decreasing back to 130 mg every 2 weeks. \par Unfortunately since then he has had very irregular bleeding he first contacted me 7/28/2020 he has had light spotting since 3 weeks before, first I recommended trying to do 70 mg every week of testosterone. I recommended also starting Provera 10 mg for 1 week. With this, menses unfortunately started August 7th and has not stopped when he contacted me 8/26/2020. The amount is not a large amount but he changed pads 2-3 times a day. I recommended restarting Provera again for 1 week and then he felt bleeding was almost gone I recommended lower to 1/2 tablet. When he contacted me 9/18 unfortunately it had gotten heavier. Trying to determine what may be more of an issue, potentially due to poor suppression of HPG axis, I increased him to 1 tablet of Provera every day and also 75 mg every week of testosterone. With this unfortunately it has not stopped, thus we increased to twice a day of Provera since 9/30/2020. This visit is plan for follow up and also to follow-up on the menses. We have discussed combined OCP which he does not want to do, and we have also reviewed possibility of considering IUD. He did see Dr. Martins 10/14/2020 and had U/S done. After much discussion he decided on Mirena IUD. Unfortunately this has not helped with his vaginal bleeding, and on 5/4/2021 he saw Dr. Martins where IUD was not in place and had to be taken out. Vicente contacted me before the visit that he was interested in hysterectomy and we reviewed we will discuss at the visit. \par \par Vicente states that he is well. He is having less bleeding lately, but still there is some bleeding and pain with cramping. He has been consistent with his testosterone, has not missed any dosage he stated always Tuesday and Wednesdays. He stated he has given significant thought, spoke with Dr. Martins interested in initially he thought the ovaries removed and Dr. Martins discussed hysterectomy would be best. He went to see Dr. Savannah Osorio on May 18th. Booked surgery for August 5th. They have discussed laparoscopic hysterectomy and Vicente's understanding is that cervix would also be removed. \par he knows his weight had fluctuated, he had lost weight but then gained, and then gained back, when working highest 206 down to 192 lb, thought maybe was 202 lb with Dr. Martins, and thought he lost a lot of weight since and thus wanted to also discuss this. \par He voiced without mother present he is very sure about not wanting to have kids at all. He felt if he really does change his mind, he would not have any problem adopting. For sure he does not want to carry children he is positive. In the future when he has a partner he does feel child does not need to be related to partner either. \par Vicente is not seeing anyone now. no specific gender attraction. 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 He has decided that he will go for a masters in art therapy.

## 2021-06-09 NOTE — PHYSICAL EXAM
[Well Nourished] : well nourished [Interactive] : interactive [Obese] : obese [Normal] : normal [Normally Set] : normally set [de-identified] : short hair, deep voice

## 2021-06-09 NOTE — CONSULT LETTER
[Dear  ___] : Dear  [unfilled], [Courtesy Letter:] : I had the pleasure of seeing your patient, [unfilled], in my office today. [Please see my note below.] : Please see my note below. [Consult Closing:] : Thank you very much for allowing me to participate in the care of this patient.  If you have any questions, please do not hesitate to contact me. [Sincerely,] : Sincerely, [DrAsh  ___] : Dr. LANDERS [DrAsh ___] : Dr. LANDERS [FreeTextEntry3] : YeouChing Hsu, MD \par Division of Pediatric Endocrinology \par [facility] \par  of Pediatrics \par [facility]

## 2021-07-26 ENCOUNTER — OUTPATIENT (OUTPATIENT)
Dept: OUTPATIENT SERVICES | Facility: HOSPITAL | Age: 21
LOS: 1 days | End: 2021-07-26
Payer: COMMERCIAL

## 2021-07-26 VITALS — WEIGHT: 186.07 LBS | HEIGHT: 65 IN

## 2021-07-26 DIAGNOSIS — F64.1 DUAL ROLE TRANSVESTISM: ICD-10-CM

## 2021-07-26 DIAGNOSIS — Z86.59 PERSONAL HISTORY OF OTHER MENTAL AND BEHAVIORAL DISORDERS: ICD-10-CM

## 2021-07-26 DIAGNOSIS — Z01.818 ENCOUNTER FOR OTHER PREPROCEDURAL EXAMINATION: ICD-10-CM

## 2021-07-26 DIAGNOSIS — Z90.13 ACQUIRED ABSENCE OF BILATERAL BREASTS AND NIPPLES: Chronic | ICD-10-CM

## 2021-07-26 DIAGNOSIS — K08.409 PARTIAL LOSS OF TEETH, UNSPECIFIED CAUSE, UNSPECIFIED CLASS: Chronic | ICD-10-CM

## 2021-07-26 LAB
ANION GAP SERPL CALC-SCNC: 12 MMOL/L — SIGNIFICANT CHANGE UP (ref 5–17)
BLD GP AB SCN SERPL QL: NEGATIVE — SIGNIFICANT CHANGE UP
BUN SERPL-MCNC: 10 MG/DL — SIGNIFICANT CHANGE UP (ref 7–23)
CALCIUM SERPL-MCNC: 9.8 MG/DL — SIGNIFICANT CHANGE UP (ref 8.4–10.5)
CHLORIDE SERPL-SCNC: 101 MMOL/L — SIGNIFICANT CHANGE UP (ref 96–108)
CO2 SERPL-SCNC: 25 MMOL/L — SIGNIFICANT CHANGE UP (ref 22–31)
CREAT SERPL-MCNC: 1 MG/DL — SIGNIFICANT CHANGE UP (ref 0.5–1.3)
GLUCOSE SERPL-MCNC: 82 MG/DL — SIGNIFICANT CHANGE UP (ref 70–99)
HCT VFR BLD CALC: 48.2 % — SIGNIFICANT CHANGE UP (ref 39–50)
HGB BLD-MCNC: 15.6 G/DL — SIGNIFICANT CHANGE UP (ref 13–17)
MCHC RBC-ENTMCNC: 29.2 PG — SIGNIFICANT CHANGE UP (ref 27–34)
MCHC RBC-ENTMCNC: 32.4 GM/DL — SIGNIFICANT CHANGE UP (ref 32–36)
MCV RBC AUTO: 90.1 FL — SIGNIFICANT CHANGE UP (ref 80–100)
NRBC # BLD: 0 /100 WBCS — SIGNIFICANT CHANGE UP (ref 0–0)
PLATELET # BLD AUTO: 313 K/UL — SIGNIFICANT CHANGE UP (ref 150–400)
POTASSIUM SERPL-MCNC: 3.8 MMOL/L — SIGNIFICANT CHANGE UP (ref 3.5–5.3)
POTASSIUM SERPL-SCNC: 3.8 MMOL/L — SIGNIFICANT CHANGE UP (ref 3.5–5.3)
RBC # BLD: 5.35 M/UL — SIGNIFICANT CHANGE UP (ref 4.2–5.8)
RBC # FLD: 12.1 % — SIGNIFICANT CHANGE UP (ref 10.3–14.5)
RH IG SCN BLD-IMP: POSITIVE — SIGNIFICANT CHANGE UP
SODIUM SERPL-SCNC: 138 MMOL/L — SIGNIFICANT CHANGE UP (ref 135–145)
WBC # BLD: 7.41 K/UL — SIGNIFICANT CHANGE UP (ref 3.8–10.5)
WBC # FLD AUTO: 7.41 K/UL — SIGNIFICANT CHANGE UP (ref 3.8–10.5)

## 2021-07-26 PROCEDURE — 83036 HEMOGLOBIN GLYCOSYLATED A1C: CPT

## 2021-07-26 PROCEDURE — 85027 COMPLETE CBC AUTOMATED: CPT

## 2021-07-26 PROCEDURE — G0463: CPT

## 2021-07-26 PROCEDURE — 86901 BLOOD TYPING SEROLOGIC RH(D): CPT

## 2021-07-26 PROCEDURE — 87086 URINE CULTURE/COLONY COUNT: CPT

## 2021-07-26 PROCEDURE — 86850 RBC ANTIBODY SCREEN: CPT

## 2021-07-26 PROCEDURE — 86900 BLOOD TYPING SEROLOGIC ABO: CPT

## 2021-07-26 PROCEDURE — 80048 BASIC METABOLIC PNL TOTAL CA: CPT

## 2021-07-26 RX ORDER — LIDOCAINE HCL 20 MG/ML
0.2 VIAL (ML) INJECTION ONCE
Refills: 0 | Status: DISCONTINUED | OUTPATIENT
Start: 2021-08-05 | End: 2021-08-19

## 2021-07-26 RX ORDER — SODIUM CHLORIDE 9 MG/ML
3 INJECTION INTRAMUSCULAR; INTRAVENOUS; SUBCUTANEOUS EVERY 8 HOURS
Refills: 0 | Status: DISCONTINUED | OUTPATIENT
Start: 2021-08-05 | End: 2021-08-19

## 2021-07-26 RX ORDER — CHOLECALCIFEROL (VITAMIN D3) 125 MCG
1 CAPSULE ORAL
Qty: 0 | Refills: 0 | DISCHARGE

## 2021-07-26 RX ORDER — CEFOTETAN DISODIUM 1 G
2 VIAL (EA) INJECTION ONCE
Refills: 0 | Status: DISCONTINUED | OUTPATIENT
Start: 2021-08-05 | End: 2021-08-19

## 2021-07-26 RX ORDER — DULOXETINE HYDROCHLORIDE 30 MG/1
1 CAPSULE, DELAYED RELEASE ORAL
Qty: 0 | Refills: 0 | DISCHARGE

## 2021-07-26 NOTE — H&P PST ADULT - NSICDXPASTMEDICALHX_GEN_ALL_CORE_FT
PAST MEDICAL HISTORY:  Anxiety     Depression     Transgender female : male   pt started hormone therapy 1/ 18 ; rx androgel     PAST MEDICAL HISTORY:  Abnormal uterine bleeding pt is on testosterone, pt gets intermittent uterine bleeding.    Anxiety     Depression     Transgender female : male   pt started hormone therapy 1/ 18 ; rx androgel

## 2021-07-26 NOTE — H&P PST ADULT - ATTENDING COMMENTS
Patient seen at bedside, with gender dysphoria plan for TLHBSO, cystoscopy. Discussed risks of procedure including but not limited to VTE, SSI, damage to bowel, bladder, need for laparotomy. Reviewed post operative care, instructions. Understands learners are part of his case and performing EUA. All questions answered at length     Savannah Osorio MD

## 2021-07-26 NOTE — H&P PST ADULT - NSICDXPROBLEM_GEN_ALL_CORE_FT
PROBLEM DIAGNOSES  Problem: H/O gender identity disorder  Assessment and Plan: scheduled for total laproscopic hystrectomy, b/l salpingo oophrectomy, cystourethroscopy on 08/05/21   preop instructions done  cbc,bmp, hemoglobin AIC,type and screen, urine culture, serum HCG         PROBLEM DIAGNOSES  Problem: H/O gender identity disorder  Assessment and Plan: scheduled for total laproscopic hystrectomy, b/l salpingo oophrectomy, cystourethroscopy on 08/05/21   preop instructions done  cbc,bmp, hemoglobin AIC,type and screen, urine culture sent   pt can take wellbutrin in the am of 08/05/21 with sips of water.

## 2021-07-26 NOTE — H&P PST ADULT - NSICDXPASTSURGICALHX_GEN_ALL_CORE_FT
PAST SURGICAL HISTORY:  S/P mastectomy, bilateral 4/2018/ revision of mastectomy in 12/2018    [place] teeth removed 06/2019

## 2021-07-26 NOTE — H&P PST ADULT - NSICDXFAMILYHX_GEN_ALL_CORE_FT
FAMILY HISTORY:  Grandparent  Still living? Unknown  Family history of breast cancer, Age at diagnosis: Age Unknown    Aunt  Still living? Unknown  Family history of breast cancer, Age at diagnosis: Age Unknown

## 2021-07-26 NOTE — H&P PST ADULT - ACTIVITY
H & P


Time Seen by Provider: 03/06/18 17:10


HPI/ROS: 





CHIEF COMPLAINT:  Neck injury neck pain





HISTORY OF PRESENT ILLNESS: obtained from child, parent, sibling.  Child today 

was hanging from the playground horizontal bar by her knees when she lost 

contact and fell on the ground landing on her head with forward flexion of her 

neck.  No loss of consciousness, sibling who was there confirmed she never was 

unconscious or asleep.  Complain of neck pain, went to urgent care, said she 

was having a little bit of difficulty"carrying things"in her arms but now has 

normal strength and sensation in all 4 extremities.


No difficulty with speech or swallowing.  No headache.  No visual symptoms.  

She walked into the emergency department.


Neck injury does not radiate.  Not associated with vertigo or dizziness or any 

acute neurologic complaints of deficit.





REVIEW OF SYSTEMS:


Constitutional: No fever.


Eyes: No discharge.


ENT: No sore throat.


Respiratory: No trouble breathing.


Cardiac:  No syncope.  Complains of some left posterior chest wall pain.


Gastrointestinal: No abdominal pain, no diarrhea or vomiting.


Genitourinary: negative.


Musculoskeletal:  No extremity injury.


Skin: No rashes.  No laceration.


Neurological: No change in behavior.





PMH:  Negative


Family History:  Negative


Social History:  Here with parents and siblings





General Appearance: The child is alert, well hydrated, appropriate and non-

toxic appearing.


ENT, mouth: TMs are clear bilaterally, no injection, no evidence of otitis.  No 

hemotympanum.


Throat: There is no erythema or exudates, no tonsillar hypertrophy.


Neck:  In a collar with midline posterior tenderness.


Respiratory:  There are no retractions, lungs are clear to auscultation.


Cardiac: Regular rate and rhythm, no murmurs or gallops.


Gastrointestinal: Abdomen is soft, no masses, no tenderness. 


Neurological: Alert, appropriate and interactive.  The child is moving all 

extremities and is appropriate for age.  Patient has normal strength in deltoids

, biceps, triceps, wrist extensors, and intrinsics bilaterally.  Good  

strength.  She can lift each leg off the bed independently.  She is able to the 

follow commands and respond appropriately to questions and has fluent speech.


Skin:  No lacerations or abrasions.


Musculoskeletal:  Midline neck tenderness but no thoracic or lumbar spine 

tenderness.  Pelvis is stable.  No extremity tenderness.  This very slight 

point tenderness in the posterior chest over the scapula on the left but no 

crepitus and breath sounds are equal and no respiratory distress.





ED course, MDM:


CT cervical spine discussed and consented with parents.  Concern for history of 

transient neurologic symptoms as well as midline cervical tenderness.


Patient does not have red flags to suggest she is at high risk for intracranial 

bleed, epidural or subdural, or subarachnoid.





1852:  CT reported as negative by Dr. Jewel Ramirez.  Clinically cleared by myself 

at this time


Has good range of motion out of the collar.  As normal upper extremity 

neurologic exam in the ED.  Will be seen by in follow-up by her pediatric 

office after discussion with them.


Constitutional: 


 Initial Vital Signs











Temperature (C)  37.1 C H  03/06/18 17:00


 


Heart Rate  95   03/06/18 17:00


 


Respiratory Rate  20   03/06/18 17:00


 


Blood Pressure  119/79 H  03/06/18 17:00


 


O2 Sat (%)  99   03/06/18 17:00








 











O2 Delivery Mode               Room Air














Allergies/Adverse Reactions: 


 





No Known Allergies Allergy (Verified 03/06/18 17:07)


 








Home Medications: 














 Medication  Instructions  Recorded


 


NK [No Known Home Meds]  03/06/18














Medical Decision Making





- Diagnostics


Imaging Results: 


 Imaging Impressions





Cervical Spine CT  03/06/18 17:21


Impression:


1. Negative for fracture.


2. Straightening of the cervical curvature suggests muscle spasm.


3. Normal variant of the C2 vertebral body.


 


Results called and discussed with Clifford Aldridge M.D. on 3/6/2018 at 18:47.











Imaging: Discussed imaging studies w/ On call Radiologist


Differential Diagnosis: 





Differential considered including but not limited to spinal cord injury, 

cervical spine fracture, cervical spine dislocation, neck strain


Consult/Admit Bed Type: [place] [id number] discussed, office will followup before 

weekend





- Data Points


Medications Given: 


 








Discontinued Medications





Ibuprofen (Motrin Oral Solution)  0 mg PO EDNOW ONE


   Stop: 03/06/18 18:54


   Last Admin: 03/06/18 19:07 Dose:  240 mg








Departure





- Departure


Disposition: Home, Routine, Self-Care


Clinical Impression: 


Neck strain


Qualifiers:


 Encounter type: initial encounter Qualified Code(s): S16.1XXA - Strain of 

muscle, fascia and tendon at neck level, initial encounter





Condition: Good


Instructions:  Cervical Strain (ED)


Referrals: 


Carmella Griffith MD [Primary Care Provider] - 1-2 days without fail
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

## 2021-07-26 NOTE — H&P PST ADULT - HISTORY OF PRESENT ILLNESS
21 year old transgender male {preferred to call him "Vicente'}with PMH of anxiety, depression, gender dysphoria, reports of irregular uterine bleeding for an year. Reports of on and off pelvic cramps. Pt is on cross sex hormone treatment with testosterone- s/p Top surgery 04/2018. Pt presented to PST for scheduled total laparoscopic hysterectomy bilateral salpingo oophorectomy , cystourethroscopy on 08/05/21. Denies chest pain, dyspnea, nausea, vomiting, blurry vision.    Denies Recent travel, Exposure or Covid symptoms  Pt fully vaccinated, reports in chart

## 2021-07-26 NOTE — H&P PST ADULT - OTHER CARE PROVIDERS
Dr JOSHI endocrinologist      [phone]                                           Dr Greenfield psychiatrist

## 2021-07-27 LAB
A1C WITH ESTIMATED AVERAGE GLUCOSE RESULT: 5.1 % — SIGNIFICANT CHANGE UP (ref 4–5.6)
ESTIMATED AVERAGE GLUCOSE: 100 MG/DL — SIGNIFICANT CHANGE UP (ref 68–114)

## 2021-07-28 LAB
CULTURE RESULTS: SIGNIFICANT CHANGE UP
SPECIMEN SOURCE: SIGNIFICANT CHANGE UP

## 2021-07-29 ENCOUNTER — APPOINTMENT (OUTPATIENT)
Dept: PEDIATRIC ADOLESCENT MEDICINE | Facility: CLINIC | Age: 21
End: 2021-07-29
Payer: COMMERCIAL

## 2021-07-29 VITALS
HEART RATE: 58 BPM | TEMPERATURE: 98.6 F | HEIGHT: 64.76 IN | DIASTOLIC BLOOD PRESSURE: 56 MMHG | OXYGEN SATURATION: 99 % | BODY MASS INDEX: 31.96 KG/M2 | SYSTOLIC BLOOD PRESSURE: 104 MMHG | WEIGHT: 189.5 LBS

## 2021-07-29 DIAGNOSIS — Z83.49 FAMILY HISTORY OF OTHER ENDOCRINE, NUTRITIONAL AND METABOLIC DISEASES: ICD-10-CM

## 2021-07-29 DIAGNOSIS — Z01.818 ENCOUNTER FOR OTHER PREPROCEDURAL EXAMINATION: ICD-10-CM

## 2021-07-29 DIAGNOSIS — N93.9 ABNORMAL UTERINE AND VAGINAL BLEEDING, UNSPECIFIED: ICD-10-CM

## 2021-07-29 DIAGNOSIS — Z80.3 FAMILY HISTORY OF MALIGNANT NEOPLASM OF BREAST: ICD-10-CM

## 2021-07-29 DIAGNOSIS — Z82.49 FAMILY HISTORY OF ISCHEMIC HEART DISEASE AND OTHER DISEASES OF THE CIRCULATORY SYSTEM: ICD-10-CM

## 2021-07-29 DIAGNOSIS — Z80.0 FAMILY HISTORY OF MALIGNANT NEOPLASM OF DIGESTIVE ORGANS: ICD-10-CM

## 2021-07-29 PROCEDURE — 99213 OFFICE O/P EST LOW 20 MIN: CPT

## 2021-07-30 PROBLEM — N93.9 ABNORMAL UTERINE BLEEDING: Status: ACTIVE | Noted: 2020-10-14

## 2021-07-30 PROBLEM — Z01.818 PRE-OPERATIVE CLEARANCE: Status: ACTIVE | Noted: 2021-07-30

## 2021-07-30 PROBLEM — Z80.3 FAMILY HISTORY OF MALIGNANT NEOPLASM OF BREAST: Status: ACTIVE | Noted: 2017-01-30

## 2021-07-30 PROBLEM — Z82.49 FAMILY HISTORY OF ATRIAL FIBRILLATION: Status: ACTIVE | Noted: 2017-07-05

## 2021-07-30 PROBLEM — Z82.49 FAMILY HISTORY OF MYOCARDIAL INFARCTION: Status: ACTIVE | Noted: 2017-03-10

## 2021-07-30 PROBLEM — Z80.0 FAMILY HISTORY OF PANCREATIC CANCER: Status: ACTIVE | Noted: 2017-01-30

## 2021-07-30 PROBLEM — Z83.49 FAMILY HISTORY OF HYPOTHYROIDISM: Status: ACTIVE | Noted: 2017-01-30

## 2021-07-30 NOTE — PHYSICAL EXAM
[Alert] : alert [No Acute Distress] : no acute distress [Normocephalic] : normocephalic [EOMI Bilateral] : EOMI bilateral [Clear tympanic membranes with bony landmarks and light reflex present bilaterally] : clear tympanic membranes with bony landmarks and light reflex present bilaterally  [Nonerythematous Oropharynx] : nonerythematous oropharynx [Pink Nasal Mucosa] : pink nasal mucosa [Supple, full passive range of motion] : supple, full passive range of motion [No Palpable Masses] : no palpable masses [Clear to Auscultation Bilaterally] : clear to auscultation bilaterally [Regular Rate and Rhythm] : regular rate and rhythm [Normal S1, S2 audible] : normal S1, S2 audible [No Murmurs] : no murmurs [Soft] : soft [NonTender] : non tender [No Abnormal Lymph Nodes Palpated] : no abnormal lymph nodes palpated [Non Distended] : non distended [Normal Muscle Tone] : normal muscle tone [No Rash or Lesions] : no rash or lesions

## 2021-08-02 NOTE — HISTORY OF PRESENT ILLNESS
[FreeTextEntry1] : 22 yo male (s/p female to male transition) here for HCM/medical clearance for hysterectomy next week. \par \par #Transgender - follows with endocrine. Seen last 2 mo ago. On cross sex hormone tx  with testosterone and s/p top surgery. In june c/o uterine bleeding, and was interested in hysterectomy/oophorectomy at that time. , had discussed it at that time. \par \par Pt had an IUD in in December 2020 and then it broke in ~May so it was taken out. Had been having uterine bleeding which stopped after progesterone had been discontinued. \par \par Had top surgery in 2018-no complications-no fevers, easy intubation, recovered well,\par no hx bleeding disorders. Had pre-op labs done on Monday which were within normal limits. \par \par Lives in a house with parents and sister, going back to [employer] in August, majoring in paiinting and drawing and minoring in psychology; wants to get a masters in art therapy, likes art and to spend time with friends, likes to read, works at a summer camp, never sexually active, occasionally drinks alcohol (3x/year at most 1-2 drinks at most), mood is good, takes time to himself when he gets sad or down, previously had SI but none in the last 6 months.

## 2021-08-02 NOTE — DISCUSSION/SUMMARY
[FreeTextEntry1] : 22 yo transgender male (on female to male hormonal therapy, s/p top surgery) here for surgical clearance prior to hysterectomy next week. No hx bleeding disorders, no hx difficult intubations or hx bad reactions to anesthesia. Physical exam reassuring. EKG obtained, will complete surgical clearance form once the EKG is read.

## 2021-08-02 NOTE — REVIEW OF SYSTEMS
[Negative] : Breast [Fever] : no fever [Chills] : no chills [Change in Weight] : no change in weight [Night Sweats] : no night sweats [Headache] : no headache [Ear Pain] : no ear pain [Sinus Pressure] : no sinus pressure [Cold Intolerance] : no cold intolerance [Heat Intolerance] : no heat intolerance [Breast Swelling] : no breast swelling [Breast Mass] : no breast mass [Breast Tenderness] : no breast tenderness

## 2021-08-04 ENCOUNTER — TRANSCRIPTION ENCOUNTER (OUTPATIENT)
Age: 21
End: 2021-08-04

## 2021-08-05 ENCOUNTER — OUTPATIENT (OUTPATIENT)
Dept: OUTPATIENT SERVICES | Facility: HOSPITAL | Age: 21
LOS: 1 days | End: 2021-08-05
Payer: COMMERCIAL

## 2021-08-05 ENCOUNTER — APPOINTMENT (OUTPATIENT)
Dept: OBGYN | Facility: HOSPITAL | Age: 21
End: 2021-08-05

## 2021-08-05 ENCOUNTER — RESULT REVIEW (OUTPATIENT)
Age: 21
End: 2021-08-05

## 2021-08-05 VITALS
TEMPERATURE: 98 F | RESPIRATION RATE: 16 BRPM | OXYGEN SATURATION: 99 % | WEIGHT: 186.07 LBS | HEART RATE: 64 BPM | SYSTOLIC BLOOD PRESSURE: 104 MMHG | DIASTOLIC BLOOD PRESSURE: 61 MMHG | HEIGHT: 65 IN

## 2021-08-05 VITALS
SYSTOLIC BLOOD PRESSURE: 114 MMHG | HEART RATE: 76 BPM | OXYGEN SATURATION: 99 % | DIASTOLIC BLOOD PRESSURE: 57 MMHG | TEMPERATURE: 98 F | RESPIRATION RATE: 16 BRPM

## 2021-08-05 DIAGNOSIS — F64.1 DUAL ROLE TRANSVESTISM: ICD-10-CM

## 2021-08-05 DIAGNOSIS — K08.409 PARTIAL LOSS OF TEETH, UNSPECIFIED CAUSE, UNSPECIFIED CLASS: Chronic | ICD-10-CM

## 2021-08-05 DIAGNOSIS — Z90.13 ACQUIRED ABSENCE OF BILATERAL BREASTS AND NIPPLES: Chronic | ICD-10-CM

## 2021-08-05 LAB
GLUCOSE BLDC GLUCOMTR-MCNC: 89 MG/DL — SIGNIFICANT CHANGE UP (ref 70–99)
RH IG SCN BLD-IMP: POSITIVE — SIGNIFICANT CHANGE UP

## 2021-08-05 PROCEDURE — C1765: CPT

## 2021-08-05 PROCEDURE — 88307 TISSUE EXAM BY PATHOLOGIST: CPT | Mod: 26

## 2021-08-05 PROCEDURE — 58571 TLH W/T/O 250 G OR LESS: CPT

## 2021-08-05 PROCEDURE — 58570 TLH UTERUS 250 G OR LESS: CPT | Mod: 80

## 2021-08-05 PROCEDURE — C1889: CPT

## 2021-08-05 PROCEDURE — 82962 GLUCOSE BLOOD TEST: CPT

## 2021-08-05 PROCEDURE — C9399: CPT

## 2021-08-05 PROCEDURE — 58570 TLH UTERUS 250 G OR LESS: CPT

## 2021-08-05 PROCEDURE — 88307 TISSUE EXAM BY PATHOLOGIST: CPT

## 2021-08-05 RX ORDER — HYDROMORPHONE HYDROCHLORIDE 2 MG/ML
0.5 INJECTION INTRAMUSCULAR; INTRAVENOUS; SUBCUTANEOUS
Refills: 0 | Status: DISCONTINUED | OUTPATIENT
Start: 2021-08-05 | End: 2021-08-05

## 2021-08-05 RX ORDER — SODIUM CHLORIDE 9 MG/ML
1000 INJECTION, SOLUTION INTRAVENOUS
Refills: 0 | Status: DISCONTINUED | OUTPATIENT
Start: 2021-08-05 | End: 2021-08-19

## 2021-08-05 RX ORDER — CHLORHEXIDINE GLUCONATE 213 G/1000ML
1 SOLUTION TOPICAL ONCE
Refills: 0 | Status: COMPLETED | OUTPATIENT
Start: 2021-08-05 | End: 2021-08-05

## 2021-08-05 RX ORDER — ONDANSETRON 8 MG/1
4 TABLET, FILM COATED ORAL ONCE
Refills: 0 | Status: DISCONTINUED | OUTPATIENT
Start: 2021-08-05 | End: 2021-08-19

## 2021-08-05 RX ORDER — BUPROPION HYDROCHLORIDE 150 MG/1
150 TABLET, EXTENDED RELEASE ORAL
Qty: 0 | Refills: 0 | DISCHARGE

## 2021-08-05 RX ORDER — OXYCODONE HYDROCHLORIDE 5 MG/1
1 TABLET ORAL
Qty: 5 | Refills: 0
Start: 2021-08-05 | End: 2021-08-06

## 2021-08-05 RX ORDER — OXYCODONE HYDROCHLORIDE 5 MG/1
5 TABLET ORAL ONCE
Refills: 0 | Status: DISCONTINUED | OUTPATIENT
Start: 2021-08-05 | End: 2021-08-05

## 2021-08-05 RX ORDER — SERTRALINE 25 MG/1
1 TABLET, FILM COATED ORAL
Qty: 0 | Refills: 0 | DISCHARGE

## 2021-08-05 RX ADMIN — CHLORHEXIDINE GLUCONATE 1 APPLICATION(S): 213 SOLUTION TOPICAL at 06:38

## 2021-08-05 RX ADMIN — SODIUM CHLORIDE 3 MILLILITER(S): 9 INJECTION INTRAMUSCULAR; INTRAVENOUS; SUBCUTANEOUS at 06:38

## 2021-08-05 RX ADMIN — OXYCODONE HYDROCHLORIDE 5 MILLIGRAM(S): 5 TABLET ORAL at 11:13

## 2021-08-05 NOTE — ASU DISCHARGE PLAN (ADULT/PEDIATRIC) - CARE PROVIDER_API CALL
Savannah Osorio)  OBSGYN  General [address] [facility], [place] [address]  Phone: [phone]  Fax: [phone]  Follow Up Time:

## 2021-08-05 NOTE — BRIEF OPERATIVE NOTE - NSICDXBRIEFPREOP_GEN_ALL_CORE_FT
PRE-OP DIAGNOSIS:  Person who has undergone gender reassignment surgery 05-Aug-2021 10:53:51  Wero Kolb

## 2021-08-05 NOTE — BRIEF OPERATIVE NOTE - OPERATION/FINDINGS
Anteverted uterus wnl. Grossly normal cervix, fallopian tubes, ovaries, liver, bowel, omentum. Amaury and Intercede placed over vaginal cuff. Specimen removed from vagina.

## 2021-08-05 NOTE — ASU PATIENT PROFILE, ADULT - PMH
Abnormal uterine bleeding  pt is on testosterone, pt gets intermittent uterine bleeding.  Anxiety    Depression    Transgender  female : male   pt started hormone therapy 1/ 18 ; rx androgel

## 2021-08-05 NOTE — ASU PATIENT PROFILE, ADULT - PSH
S/P mastectomy, bilateral  4/2018/ revision of mastectomy in 12/2018  [place] teeth removed  06/2019

## 2021-08-05 NOTE — BRIEF OPERATIVE NOTE - NSICDXBRIEFPROCEDURE_GEN_ALL_CORE_FT
PROCEDURES:  Laparoscopic total hysterectomy with cystoscopy 05-Aug-2021 10:52:29  Wero Kolb  Salpingo-oophorectomy, bilateral 05-Aug-2021 10:52:44  Wero Kolb

## 2021-08-11 PROBLEM — N93.9 ABNORMAL UTERINE AND VAGINAL BLEEDING, UNSPECIFIED: Chronic | Status: ACTIVE | Noted: 2021-07-26

## 2021-08-17 ENCOUNTER — APPOINTMENT (OUTPATIENT)
Dept: OBGYN | Facility: CLINIC | Age: 21
End: 2021-08-17
Payer: COMMERCIAL

## 2021-08-17 PROCEDURE — 99024 POSTOP FOLLOW-UP VISIT: CPT

## 2021-08-19 ENCOUNTER — APPOINTMENT (OUTPATIENT)
Dept: PEDIATRIC ENDOCRINOLOGY | Facility: CLINIC | Age: 21
End: 2021-08-19
Payer: COMMERCIAL

## 2021-08-19 DIAGNOSIS — Z51.81 ENCOUNTER FOR THERAPEUTIC DRUG LVL MONITORING: ICD-10-CM

## 2021-08-19 DIAGNOSIS — E66.9 OBESITY, UNSPECIFIED: ICD-10-CM

## 2021-08-19 LAB
ESTRADIOL SERPL-MCNC: 26 PG/ML
SURGICAL PATHOLOGY STUDY: SIGNIFICANT CHANGE UP
TESTOST SERPL-MCNC: 333 NG/DL

## 2021-08-19 PROCEDURE — 99213 OFFICE O/P EST LOW 20 MIN: CPT | Mod: 95

## 2021-08-19 RX ORDER — OXYCODONE 5 MG/1
5 TABLET ORAL
Qty: 5 | Refills: 0 | Status: DISCONTINUED | COMMUNITY
Start: 2021-08-05

## 2021-08-19 RX ORDER — CHOLECALCIFEROL (VITAMIN D3) 50 MCG
50 MCG TABLET ORAL
Qty: 30 | Refills: 5 | Status: DISCONTINUED | COMMUNITY
Start: 2017-07-05 | End: 2020-08-19

## 2021-08-21 NOTE — CONSULT LETTER
[Dear  ___] : Dear  [unfilled], [Courtesy Letter:] : I had the pleasure of seeing your patient, [unfilled], in my office today. [Please see my note below.] : Please see my note below. [Consult Closing:] : Thank you very much for allowing me to participate in the care of this patient.  If you have any questions, please do not hesitate to contact me. [Sincerely,] : Sincerely, [FreeTextEntry3] : YeouChing Hsu, MD \par Division of Pediatric Endocrinology \par [facility] \par  of Pediatrics \par [facility] [employer] [facility] [DrAsh  ___] : Dr. LANDERS [DrAsh ___] : Dr. LANDERS

## 2021-08-21 NOTE — PHYSICAL EXAM
[Well Nourished] : well nourished [Interactive] : interactive [Normal Appearance] : normal appearance [Well formed] : well formed [Normally Set] : normally set [Normal] : normal [de-identified] : no masses appreciated on telehealth

## 2021-08-21 NOTE — HISTORY OF PRESENT ILLNESS
[Headaches] : no headaches [Polyuria] : no polyuria [Polydipsia] : no polydipsia [Constipation] : no constipation [Fatigue] : no fatigue [Abdominal Pain] : no abdominal pain [Vomiting] : no vomiting [FreeTextEntry2] : Vicente is a now 21 year 7 month old transgender male who presents today for follow-up on cross sex hormone treatment with testosterone who is s/p top surgery and recently has had issues with irregular uterine bleeding. He has been followed by psychiatrist Dr. Greenfield and therapist Ms. Samreen Corea. I met him for the first time 7/5/2017 for a discussion he decided not to opt for hormone suppression treatment. He returned 1/10/18 after he was cleared to start cross sex hormone treatment. Shot 1/25/18, gel since February 2018 worked up to full dosage. He was seen again in 4/2/2019 for follow-up when he switched to injections. He was started on testosterone 100 mg every 2 weeks and dosage has been adjusted based on results. There was a persistent issue where trying to get his testosterone to goal his estradiol ends up slightly above 50. Since July 2020 he has been having on and off issues with irregular spotting which has been distressing to Vicente. I recommended progesterone and initially appeared to work, but it only decreased and persistently was an issue even with higher dosage of Provera. We have discussed combined OCP which he does not want to do, and we have also reviewed possibility of considering IUD. He did see Dr. Martins 10/14/2020 and had U/S done. After much discussion he decided on Mirena IUD. Unfortunately this has not helped with his vaginal bleeding, and on 5/4/2021 he saw Dr. Martins they noted that IUD was not in place and had to be taken out. \georges Zhang contacted me before the 6/2/2021 visit that he was interested in hysterectomy. He already met with Dr. Savannah Osorio. Vicente's understanding is that cervix would also be removed. At the visit they also thought he lost a significant amount of weight in a short time but it appeared he was just told the wrong weight the visit before. He voiced without mother present he is very sure about not wanting to have kids at all. He felt if he really does change his mind, he would not have any problem adopting. I reviewed risks and benefits of leaving ovaries in he was not interested in ever having female hormone even if his testosterone somehow was interrupted. He had certainly considered everything clearly and I wrote him supportive letter of support. I recommended as the spotting was still occurring to stop progesterone, as by then likely the lining is friable was the cause of spotting. \par \par Today Vicente states he is well. He had blood work obtained last week in anticipation of the visit. He had hysterectomy and B/L oophorectomy on 8/5 and did well. He stated that cervix also removed. he did go to adolescent medicine end of July for medical clearance. He has been taking testosterone consistently every Tuesday. He has no complaints. He did have questions about voice therapy\par He has not seen Samreen for a while, has been very busy, probably going to see her again, before going away from school. He is seeing Dr. Greenfield consistently and consistent with his medications.

## 2021-09-17 ENCOUNTER — APPOINTMENT (OUTPATIENT)
Dept: OBGYN | Facility: CLINIC | Age: 21
End: 2021-09-17
Payer: COMMERCIAL

## 2021-09-17 PROCEDURE — 99024 POSTOP FOLLOW-UP VISIT: CPT

## 2021-10-28 ENCOUNTER — NON-APPOINTMENT (OUTPATIENT)
Age: 21
End: 2021-10-28

## 2021-10-28 ENCOUNTER — APPOINTMENT (OUTPATIENT)
Dept: TRANSGENDER CARE | Facility: CLINIC | Age: 21
End: 2021-10-28

## 2021-11-04 ENCOUNTER — APPOINTMENT (OUTPATIENT)
Dept: TRANSGENDER CARE | Facility: CLINIC | Age: 21
End: 2021-11-04
Payer: COMMERCIAL

## 2021-11-04 VITALS
OXYGEN SATURATION: 98 % | SYSTOLIC BLOOD PRESSURE: 112 MMHG | HEART RATE: 76 BPM | WEIGHT: 185 LBS | TEMPERATURE: 98 F | HEIGHT: 65 IN | BODY MASS INDEX: 30.82 KG/M2 | DIASTOLIC BLOOD PRESSURE: 68 MMHG

## 2021-11-04 DIAGNOSIS — Z76.89 PERSONS ENCOUNTERING HEALTH SERVICES IN OTHER SPECIFIED CIRCUMSTANCES: ICD-10-CM

## 2021-11-04 DIAGNOSIS — E55.9 VITAMIN D DEFICIENCY, UNSPECIFIED: ICD-10-CM

## 2021-11-04 DIAGNOSIS — Z80.8 FAMILY HISTORY OF MALIGNANT NEOPLASM OF OTHER ORGANS OR SYSTEMS: ICD-10-CM

## 2021-11-04 PROCEDURE — 99204 OFFICE O/P NEW MOD 45 MIN: CPT

## 2021-11-04 NOTE — HISTORY OF PRESENT ILLNESS
[FreeTextEntry1] : establishing primary care, gender affirming care [de-identified] : \par • Reason for Appointment: Vicente Pettit is a 21 yr old AFAB, ID Male, He/Him pronouns re-engaging with the LGBT Program to establish primary care. Pt is in care with network of providers from the center that include endocrinology, plastics, and GYN. \par \par • COVID Screening: Fully vaccinated, received Pfizer vaccine. \par \par General health: Doing well overall. No recent illness. Feeling well. Sleeping OK, diet is ok. Likes to go on long walks and lifts weight occasionally. No concerns at present. \par \par Gender Summary: Vicente came to terms with his gender identity at 15. He told his friends first he was a transgender boy. Shortly after, he came out to his mother. It took her some time to understand but now she is more receptive. He was shy to tell his father, so he asked his mother to tell him. Father is affirming. Vicente socially transitioned in 11th grade. He lives with his parents and younger sister. He confirmed he is safe, has food security and reliable transportation. He completed legal name and gender marker change in 2017. Had chest masculinization surgery in April 2018 and then a revision in late 2018. Happy with results now. Had a hysterectomy August 2021. He presents masculine. He does not pack. Reports worry with the sound of his voice but will begin masculine voice training soon through [facility] ([facility]). Student at [employer]; recently “was pulled from school” due to suicidal thoughts. Since returning home, he feels much better. Employed at [employer], he is a new hire. Likes it so far. He is not out at work. \par \par Is treated for anxiety and depression. Takes 150mg of Wellbutrin and 75mg of Zoloft. Meets with psychiatrist, Dr. Piedad Stanton ([phone]) monthly. Meets weekly with therapist, Samreen Baez ([phone]). Denies hx of psych admissions. Denies sleeping problems. Denies hx of violence. Denies SI/HI since coming home (no plans). No interest in peer resources or participating in support groups. Social support, family, and friends. Hobbies: painting, walking, reading manga/anime comics. \par \par Was in care with pediatrician, Dr. Guillermo Silvestre. Pt has a great relationship with provider. Confirmed immunizations are up to date. Denies nutritional concerns. Eats 2x daily, physical activity – walking and lifting. He is 5’5 and 181lbs. In care with lee choi. On GHT since 2018. Transferring care to Dr. Gonzalez at his [place] office. Testosterone intramuscular injections 100mg, injects.8ml weekly. Satisfied with current dose. No interest in egg freezing. \par \par He is bisexual. Single. Last had sex two years ago. Never tested for HIV/STI’s.\par

## 2021-11-04 NOTE — REVIEW OF SYSTEMS
[Suicidal] : suicidal [Anxiety] : anxiety [Depression] : depression [Fever] : no fever [Chills] : no chills [Fatigue] : no fatigue [Hot Flashes] : no hot flashes [Night Sweats] : no night sweats [Discharge] : no discharge [Pain] : no pain [Vision Problems] : no vision problems [Itching] : no itching [Earache] : no earache [Nasal Discharge] : no nasal discharge [Sore Throat] : no sore throat [Chest Pain] : no chest pain [Palpitations] : no palpitations [Lower Ext Edema] : no lower extremity edema [Shortness Of Breath] : no shortness of breath [Wheezing] : no wheezing [Cough] : no cough [Abdominal Pain] : no abdominal pain [Nausea] : no nausea [Constipation] : no constipation [Vomiting] : no vomiting [Heartburn] : no heartburn [Dysuria] : no dysuria [Hematuria] : no hematuria [Frequency] : no frequency [Joint Pain] : no joint pain [Joint Stiffness] : no joint stiffness [Muscle Pain] : no muscle pain [Back Pain] : no back pain [Itching] : no itching [Mole Changes] : no mole changes [Nail Changes] : no nail changes [Hair Changes] : no hair changes [Skin Rash] : no skin rash [Headache] : no headache [Dizziness] : no dizziness [Fainting] : no fainting [Insomnia] : no insomnia [Easy Bleeding] : no easy bleeding [Easy Bruising] : no easy bruising [Swollen Glands] : no swollen glands [de-identified] : occasional passing suicidal thoughts, no intent or plan

## 2021-11-04 NOTE — ASSESSMENT
[FreeTextEntry1] : \par Gender:\par Will see endo to manage hormones. Transferring from peds to adult.\par Name/gender marker changes are done.\par \par Depression: \par Continue Zoloft and Wellbutrin\par Following with psych and therapist\par \par HCM:\par No labs today, but labs from previous endo visit were reviewed.\par Flu vaccine today. \par F/u in 1 year or PRN. \par

## 2021-11-04 NOTE — PHYSICAL EXAM
[No Acute Distress] : no acute distress [Well Nourished] : well nourished [Well Developed] : well developed [Well-Appearing] : well-appearing [Normal Sclera/Conjunctiva] : normal sclera/conjunctiva [EOMI] : extraocular movements intact [Normal Outer Ear/Nose] : the outer ears and nose were normal in appearance [Normal Oropharynx] : the oropharynx was normal [Supple] : supple [No Respiratory Distress] : no respiratory distress  [No Accessory Muscle Use] : no accessory muscle use [Clear to Auscultation] : lungs were clear to auscultation bilaterally [Normal Rate] : normal rate  [Regular Rhythm] : with a regular rhythm [Normal S1, S2] : normal S1 and S2 [No Murmur] : no murmur heard [No Varicosities] : no varicosities [No Edema] : there was no peripheral edema [No Extremity Clubbing/Cyanosis] : no extremity clubbing/cyanosis [Soft] : abdomen soft [Non Tender] : non-tender [Non-distended] : non-distended [Normal Bowel Sounds] : normal bowel sounds [Coordination Grossly Intact] : coordination grossly intact [No Focal Deficits] : no focal deficits [Normal Gait] : normal gait [Normal Affect] : the affect was normal [Normal Insight/Judgement] : insight and judgment were intact

## 2021-11-16 ENCOUNTER — APPOINTMENT (OUTPATIENT)
Dept: ENDOCRINOLOGY | Facility: CLINIC | Age: 21
End: 2021-11-16
Payer: COMMERCIAL

## 2021-11-16 VITALS
DIASTOLIC BLOOD PRESSURE: 64 MMHG | SYSTOLIC BLOOD PRESSURE: 105 MMHG | TEMPERATURE: 98.2 F | WEIGHT: 186.38 LBS | OXYGEN SATURATION: 98 % | HEIGHT: 65 IN | BODY MASS INDEX: 31.05 KG/M2 | HEART RATE: 70 BPM

## 2021-11-16 PROCEDURE — 99205 OFFICE O/P NEW HI 60 MIN: CPT

## 2021-11-16 NOTE — HISTORY OF PRESENT ILLNESS
[FreeTextEntry1] : Vicente is a now 21 year old transgender male who presents today for cross sex hormone treatment with testosterone who is s/p top surgery double incision 4/2018 by Dr. Le with revision 12/2018. Was binding with GC2B prior to top surgery. Also s/p NITA by Dr. Osorio 8/2021. He has been followed by psychiatrist Dr. Greenfield every month and therapist Ms. Samreen Corea. Seen initially by peds jimbo Gonzalez 7/5/2017 for a discussion he decided not to opt for hormone suppression treatment. He returned 1/10/18 after he was cleared to start cross sex hormone treatment. He was started on testosterone 100 mg every 2 weeks and dosage has been adjusted based on results. Since starting hormones has noticed hair growth, bottom growth, amenorrhea, body fat redistribution, deeper voice. Happy with changes thus far. Starting July 2020 he had been having on and off issues with irregular spotting which has been distressing to Vicente. He tried progesterone and initially appeared to work, but it only decreased and persistently was an issue even with higher dosage of Provera and IUD, but unfortunately this did not helped with his vaginal bleeding, and on 5/4/2021 he saw Dr. Martins they noted that IUD was not in place and had to be taken out. Now s/p NITA with Dr. Savannah Osorio. Was not interested in egg freezing. \par No headaches, changes in vision, chest pain, SOB, palpitations, LE swelling, calf pain\par Nonsmoker\par No hx of thromboembolic disease or liver disorders. \par Has been taking voice lessons. \par

## 2021-11-16 NOTE — REVIEW OF SYSTEMS
[Fatigue] : no fatigue [Recent Weight Gain (___ Lbs)] : no recent weight gain [Recent Weight Loss (___ Lbs)] : no recent weight loss [Visual Field Defect] : no visual field defect [Dysphagia] : no dysphagia [Dysphonia] : no dysphonia [Chest Pain] : no chest pain [Palpitations] : no palpitations [Shortness Of Breath] : no shortness of breath [Nausea] : no nausea [Constipation] : no constipation [Vomiting] : no vomiting [Diarrhea] : no diarrhea [Polyuria] : no polyuria [Joint Pain] : no joint pain [Headaches] : no headaches [Tremors] : no tremors [Polydipsia] : no polydipsia [Cold Intolerance] : no cold intolerance [Heat Intolerance] : no heat intolerance [All other systems negative] : All other systems negative

## 2021-11-16 NOTE — PHYSICAL EXAM
[Alert] : alert [Well Nourished] : well nourished [Healthy Appearance] : healthy appearance [No Acute Distress] : no acute distress [No Proptosis] : no proptosis [Normal Hearing] : hearing was normal [No Respiratory Distress] : no respiratory distress [No Accessory Muscle Use] : no accessory muscle use [Normal Rate and Effort] : normal respiratory rate and effort [Clear to Auscultation] : lungs were clear to auscultation bilaterally [Normal S1, S2] : normal S1 and S2 [Normal Rate] : heart rate was normal [Regular Rhythm] : with a regular rhythm [No Edema] : no peripheral edema [Normal Bowel Sounds] : normal bowel sounds [Not Tender] : non-tender [Not Distended] : not distended [Soft] : abdomen soft [Kyphosis] : no kyphosis present [No Stigmata of Cushings Syndrome] : no stigmata of Cushings Syndrome [No Clubbing, Cyanosis] : no clubbing  or cyanosis of the fingernails [Normal Strength/Tone] : muscle strength and tone were normal [Oriented x3] : oriented to person, place, and time [Normal Affect] : the affect was normal [Normal Mood] : the mood was normal [de-identified] : b/l mastectomy scars healed

## 2021-11-16 NOTE — REASON FOR VISIT
[Initial Evaluation] : an initial evaluation [Transgender Care] : transgender care [FreeTextEntry2] : Dr. Mensah

## 2021-11-16 NOTE — CONSULT LETTER
[Dear  ___] : Dear  [unfilled], [Consult Letter:] : I had the pleasure of evaluating your patient, [unfilled]. [Please see my note below.] : Please see my note below. [Consult Closing:] : Thank you very much for allowing me to participate in the care of this patient.  If you have any questions, please do not hesitate to contact me. [Sincerely,] : Sincerely, [FreeTextEntry2] : Dr. Issac Mensah\par [address]\par [place] [id number] [FreeTextEntry3] : Saul Gonzalez DO\par Division of Endocrinology\par [facility]\par [facility]\par  of Medicine\par [employer] [facility] [employer]\par Director of [facility]

## 2021-11-16 NOTE — ASSESSMENT
[FreeTextEntry1] : 22 y/o trans male on masculinizing hormone therapy. Discussed hormone affirming treatment for masculinization with testosterone. Reviewed various formulations of testosterone. Pt. prefers injections. Continue with testosterone 0.8ml weekly of 100mg/ml. Risks and benefits of testosterone described including polycythemia and liver effects. Reviewed goal testosterone level of 300-400 as trough and 700-800 as peak level. Mid-range ideally should be 500-600 for cis-gender mid male range. Will repeat levels today on current dose and adjust as needed. GYN follow-up recommended. No plans for reproductive assistance. Will needs age-appropriate cancer screening in the future such as mammogram, colonoscopy.\par \par Prep time with review of labs and interval progress notes and consultations \par Discussion with patient regarding  hormone regimen with management plan, risks and benefits, treatment options and goals of care answering all patients questions and addressing all concerns \par Post-visit completion charting and review \par Total Time 60 min\par \par Specifically causes, evaluation, treatment options, risks, complications, and benefits of available therapies were discussed. Questions were answered.\par \par The submitted E/M billing level for this visit reflects the total time spent on the day of the visit including face-to-face time spent with the patient, non-face-to-face review of medical records and relevant information, documentation, and asynchronous communication with the patient after a visit via phone, email, or patient’s EHR portal after the visit. \par The medical records reviewed are either scanned into the chart or reviewed with the patient using a patient’s electronic medical records portal for patients with records not available to [facility] via electronic transmission platforms from other institutions and labs. \par Time spend counseling and performing coordination of care was also included in determining the appropriate EM billing level.\par \par I have reviewed and verified information regarding the chief complaint and history recorded by the ancillary staff and/or the patient. I have independently reviewed and interpreted tests performed by other physicians and facilities as necessary. \par \par I have discussed with the patient differential diagnosis, reason for auxiliary tests if ordered, risks, benefits, alternatives, and complications of each form of therapy were discussed. \par \par \par

## 2021-11-18 LAB
ALBUMIN SERPL ELPH-MCNC: 5.2 G/DL
ALP BLD-CCNC: 90 U/L
ALT SERPL-CCNC: 20 U/L
ANION GAP SERPL CALC-SCNC: 15 MMOL/L
AST SERPL-CCNC: 22 U/L
BASOPHILS # BLD AUTO: 0.02 K/UL
BASOPHILS NFR BLD AUTO: 0.3 %
BILIRUB SERPL-MCNC: 0.7 MG/DL
BUN SERPL-MCNC: 13 MG/DL
CALCIUM SERPL-MCNC: 10.3 MG/DL
CHLORIDE SERPL-SCNC: 102 MMOL/L
CHOLEST SERPL-MCNC: 211 MG/DL
CO2 SERPL-SCNC: 23 MMOL/L
CREAT SERPL-MCNC: 1.02 MG/DL
EOSINOPHIL # BLD AUTO: 0.08 K/UL
EOSINOPHIL NFR BLD AUTO: 1.4 %
GLUCOSE SERPL-MCNC: 85 MG/DL
HCT VFR BLD CALC: 54 %
HDLC SERPL-MCNC: 52 MG/DL
HGB BLD-MCNC: 17 G/DL
IMM GRANULOCYTES NFR BLD AUTO: 0.3 %
LDLC SERPL CALC-MCNC: 136 MG/DL
LYMPHOCYTES # BLD AUTO: 1.55 K/UL
LYMPHOCYTES NFR BLD AUTO: 26.8 %
MAN DIFF?: NORMAL
MCHC RBC-ENTMCNC: 29.1 PG
MCHC RBC-ENTMCNC: 31.5 GM/DL
MCV RBC AUTO: 92.3 FL
MONOCYTES # BLD AUTO: 0.49 K/UL
MONOCYTES NFR BLD AUTO: 8.5 %
NEUTROPHILS # BLD AUTO: 3.62 K/UL
NEUTROPHILS NFR BLD AUTO: 62.7 %
NONHDLC SERPL-MCNC: 159 MG/DL
PLATELET # BLD AUTO: 316 K/UL
POTASSIUM SERPL-SCNC: 4.6 MMOL/L
PROT SERPL-MCNC: 7.5 G/DL
RBC # BLD: 5.85 M/UL
RBC # FLD: 12.5 %
SODIUM SERPL-SCNC: 141 MMOL/L
TESTOST SERPL-MCNC: 474 NG/DL
TRIGL SERPL-MCNC: 118 MG/DL
WBC # FLD AUTO: 5.78 K/UL

## 2021-11-19 LAB — SHBG SERPL-SCNC: 23.6 NMOL/L

## 2022-01-05 NOTE — PACU DISCHARGE NOTE - PAIN:
Akil Cheney MD             Christofer can hold warfarin for 3 days prior to colonoscopy.                Chart reviewed. Last Colonoscopy  on 1/12/21 with Dr. Foy . Repeat colonoscopy with 2 days of clear liquid diet and 2 days of bowel prep in 1  year  Please call pt to schedule Colonoscopy  with Dr. Foy.      Schedule Procedure:   Please Schedule Routine (next available or patient preference)  Procedure: Colonoscopy ([id number]) , to include  2 days of clear liquid diet and 2 days of bowel prep No suprep Chronic kidney disease   Diagnosis: History of Colon Polyps Z86.010  Is patient:    Diabetic? Yes: Hold oral medications day of procedure   ANTIPLATELET / ANTICOAGULATION:  Continue Asa Hold coumadin for 3 days prior to procedure   Hold Iron for one week prior to procedure  Latex allergy: No  Sleep apnea: No  Location: Hospital   Special Instructions:   MAC Anesthesia  Covid: Fully Vaccinated  No  Immunocompromised:  No  Chronic respiratory failure, on oxygen, severe COPD, Moderate pulmonary hypertension,  Atrial Fib/ flutter  Please send procedure date to coumadin clinic ([facility] (Open Enrollment) [facility] )   Covid order placed     Controlled with current regime

## 2022-05-19 LAB
ALBUMIN SERPL ELPH-MCNC: 4.9 G/DL
ALP BLD-CCNC: 75 U/L
ALT SERPL-CCNC: 19 U/L
ANION GAP SERPL CALC-SCNC: 13 MMOL/L
AST SERPL-CCNC: 21 U/L
BASOPHILS # BLD AUTO: 0.02 K/UL
BASOPHILS NFR BLD AUTO: 0.4 %
BILIRUB SERPL-MCNC: 0.7 MG/DL
BUN SERPL-MCNC: 14 MG/DL
CALCIUM SERPL-MCNC: 9.7 MG/DL
CHLORIDE SERPL-SCNC: 101 MMOL/L
CHOLEST SERPL-MCNC: 198 MG/DL
CO2 SERPL-SCNC: 26 MMOL/L
CREAT SERPL-MCNC: 1.16 MG/DL
EGFR: 91 ML/MIN/1.73M2
EOSINOPHIL # BLD AUTO: 0.07 K/UL
EOSINOPHIL NFR BLD AUTO: 1.3 %
ESTRADIOL SERPL-MCNC: 33 PG/ML
GLUCOSE SERPL-MCNC: 98 MG/DL
HCT VFR BLD CALC: 48.9 %
HDLC SERPL-MCNC: 46 MG/DL
HGB BLD-MCNC: 16.2 G/DL
IMM GRANULOCYTES NFR BLD AUTO: 0.4 %
LDLC SERPL CALC-MCNC: 131 MG/DL
LYMPHOCYTES # BLD AUTO: 1.36 K/UL
LYMPHOCYTES NFR BLD AUTO: 24.4 %
MAN DIFF?: NORMAL
MCHC RBC-ENTMCNC: 29 PG
MCHC RBC-ENTMCNC: 33.1 GM/DL
MCV RBC AUTO: 87.5 FL
MONOCYTES # BLD AUTO: 0.64 K/UL
MONOCYTES NFR BLD AUTO: 11.5 %
NEUTROPHILS # BLD AUTO: 3.46 K/UL
NEUTROPHILS NFR BLD AUTO: 62 %
NONHDLC SERPL-MCNC: 152 MG/DL
PLATELET # BLD AUTO: 255 K/UL
POTASSIUM SERPL-SCNC: 4.3 MMOL/L
PROT SERPL-MCNC: 7.1 G/DL
RBC # BLD: 5.59 M/UL
RBC # FLD: 12.1 %
SHBG SERPL-SCNC: 26.2 NMOL/L
SODIUM SERPL-SCNC: 139 MMOL/L
TESTOST SERPL-MCNC: 618 NG/DL
TRIGL SERPL-MCNC: 103 MG/DL
WBC # FLD AUTO: 5.57 K/UL

## 2022-07-05 ENCOUNTER — APPOINTMENT (OUTPATIENT)
Dept: ENDOCRINOLOGY | Facility: CLINIC | Age: 22
End: 2022-07-05

## 2022-07-05 VITALS
WEIGHT: 192 LBS | HEART RATE: 79 BPM | OXYGEN SATURATION: 98 % | HEIGHT: 65 IN | TEMPERATURE: 98 F | SYSTOLIC BLOOD PRESSURE: 123 MMHG | DIASTOLIC BLOOD PRESSURE: 71 MMHG | BODY MASS INDEX: 31.99 KG/M2

## 2022-07-05 PROCEDURE — 99213 OFFICE O/P EST LOW 20 MIN: CPT

## 2022-07-05 RX ORDER — SERTRALINE HYDROCHLORIDE 50 MG/1
50 TABLET, FILM COATED ORAL DAILY
Refills: 0 | Status: ACTIVE | COMMUNITY
Start: 2021-04-14

## 2022-07-05 RX ORDER — OXYBUTYNIN CHLORIDE 5 MG/1
5 TABLET, EXTENDED RELEASE ORAL
Qty: 30 | Refills: 0 | Status: ACTIVE | COMMUNITY
Start: 2022-07-01

## 2022-07-05 RX ORDER — GLYCOPYRROLATE 2 MG/1
2 TABLET ORAL
Qty: 180 | Refills: 0 | Status: ACTIVE | COMMUNITY
Start: 2022-05-03

## 2022-07-05 NOTE — HISTORY OF PRESENT ILLNESS
[FreeTextEntry1] : Vicente is a now 22 year old transgender male who presents today for cross sex hormone treatment with testosterone who is s/p top surgery double incision 4/2018 by Dr. Le with revision 12/2018. Was binding with GC2B prior to top surgery. Also s/p NITA by Dr. Osorio 8/2021. He has been followed by psychiatrist Dr. Greenfield every month and therapist Ms. Samreen Corea. Seen initially by peds jimbo Gonzalez 7/5/2017 for a discussion he decided not to opt for hormone suppression treatment. He returned 1/10/18 after he was cleared to start cross sex hormone treatment. He was started on testosterone 100 mg every 2 weeks and dosage has been adjusted based on results. Since starting hormones has noticed hair growth, bottom growth, amenorrhea, body fat redistribution, deeper voice. Happy with changes thus far. Starting July 2020 he had been having on and off issues with irregular spotting which has been distressing to Vicente. He tried progesterone and initially appeared to work, but it only decreased and persistently was an issue even with higher dosage of Provera and IUD, but unfortunately this did not helped with his vaginal bleeding, and on 5/4/2021 he saw Dr. Martins they noted that IUD was not in place and had to be taken out. Now s/p NITA with Dr. Savannah Osorio. Was not interested in egg freezing. \par No headaches, changes in vision, chest pain, SOB, palpitations, LE swelling, calf pain\par Nonsmoker\par No hx of thromboembolic disease or liver disorders. \par Has been taking voice lessons. \par

## 2022-07-05 NOTE — ASSESSMENT
[FreeTextEntry1] : 21 y/o trans male on masculinizing hormone therapy. Discussed hormone affirming treatment for masculinization with testosterone. Reviewed various formulations of testosterone. Pt. prefers injections. Continue with testosterone 0.8ml weekly of 100mg/ml. Last dose 1 week ago. Risks and benefits of testosterone described including polycythemia and liver effects. Reviewed goal testosterone level of 300-400 as trough and 700-800 as peak level. Mid-range ideally should be 500-600 for cis-gender mid male range. Will repeat levels today on current dose and adjust as needed. GYN follow-up recommended. No plans for reproductive assistance. Will needs age-appropriate cancer screening in the future such as mammogram, colonoscopy.\par \par \par \par \par

## 2022-07-05 NOTE — PHYSICAL EXAM
[Alert] : alert [Well Nourished] : well nourished [Healthy Appearance] : healthy appearance [No Acute Distress] : no acute distress [No Proptosis] : no proptosis [Normal Hearing] : hearing was normal [No Respiratory Distress] : no respiratory distress [No Accessory Muscle Use] : no accessory muscle use [Normal Rate and Effort] : normal respiratory rate and effort [Clear to Auscultation] : lungs were clear to auscultation bilaterally [Normal S1, S2] : normal S1 and S2 [Normal Rate] : heart rate was normal [Regular Rhythm] : with a regular rhythm [No Edema] : no peripheral edema [Normal Bowel Sounds] : normal bowel sounds [Not Tender] : non-tender [Not Distended] : not distended [Soft] : abdomen soft [No Stigmata of Cushings Syndrome] : no stigmata of Cushings Syndrome [No Clubbing, Cyanosis] : no clubbing  or cyanosis of the fingernails [Normal Strength/Tone] : muscle strength and tone were normal [Oriented x3] : oriented to person, place, and time [Normal Affect] : the affect was normal [Normal Mood] : the mood was normal [Kyphosis] : no kyphosis present [No Involuntary Movements] : no involuntary movements were seen [Acanthosis Nigricans] : no acanthosis nigricans [No Motor Deficits] : the motor exam was normal [No Tremors] : no tremors [de-identified] : b/l mastectomy scars healed

## 2022-07-05 NOTE — REVIEW OF SYSTEMS
[All other systems negative] : All other systems negative [Fatigue] : no fatigue [Recent Weight Gain (___ Lbs)] : no recent weight gain [Recent Weight Loss (___ Lbs)] : no recent weight loss [Visual Field Defect] : no visual field defect [Dysphagia] : no dysphagia [Dysphonia] : no dysphonia [Chest Pain] : no chest pain [Palpitations] : no palpitations [Shortness Of Breath] : no shortness of breath [Nausea] : no nausea [Constipation] : no constipation [Vomiting] : no vomiting [Diarrhea] : no diarrhea [Polyuria] : no polyuria [Joint Pain] : no joint pain [Headaches] : no headaches [Tremors] : no tremors [Polydipsia] : no polydipsia [Cold Intolerance] : no cold intolerance [Heat Intolerance] : no heat intolerance

## 2022-07-05 NOTE — DATA REVIEWED
[FreeTextEntry1] : Labs 4/2022:\par Testosterone 618\par Estradiol 33\par \par Chol 198\par HDL 46\par \par CMP normal \par CBC normal\par \par Labs 8/2021:\par Estradiol 26\par Testosterone 333

## 2022-07-06 LAB
ALBUMIN SERPL ELPH-MCNC: 4.7 G/DL
ALP BLD-CCNC: 83 U/L
ALT SERPL-CCNC: 18 U/L
ANION GAP SERPL CALC-SCNC: 14 MMOL/L
AST SERPL-CCNC: 25 U/L
BASOPHILS # BLD AUTO: 0.03 K/UL
BASOPHILS NFR BLD AUTO: 0.4 %
BILIRUB SERPL-MCNC: 0.3 MG/DL
BUN SERPL-MCNC: 8 MG/DL
CALCIUM SERPL-MCNC: 9.7 MG/DL
CHLORIDE SERPL-SCNC: 104 MMOL/L
CO2 SERPL-SCNC: 25 MMOL/L
CREAT SERPL-MCNC: 1.08 MG/DL
EGFR: 100 ML/MIN/1.73M2
EOSINOPHIL # BLD AUTO: 0.1 K/UL
EOSINOPHIL NFR BLD AUTO: 1.5 %
GLUCOSE SERPL-MCNC: 99 MG/DL
HCT VFR BLD CALC: 48.3 %
HGB BLD-MCNC: 15.4 G/DL
IMM GRANULOCYTES NFR BLD AUTO: 0.3 %
LYMPHOCYTES # BLD AUTO: 1.59 K/UL
LYMPHOCYTES NFR BLD AUTO: 23.6 %
MAN DIFF?: NORMAL
MCHC RBC-ENTMCNC: 29.7 PG
MCHC RBC-ENTMCNC: 31.9 GM/DL
MCV RBC AUTO: 93.1 FL
MONOCYTES # BLD AUTO: 0.62 K/UL
MONOCYTES NFR BLD AUTO: 9.2 %
NEUTROPHILS # BLD AUTO: 4.38 K/UL
NEUTROPHILS NFR BLD AUTO: 65 %
PLATELET # BLD AUTO: 268 K/UL
POTASSIUM SERPL-SCNC: 4.4 MMOL/L
PROT SERPL-MCNC: 7.1 G/DL
RBC # BLD: 5.19 M/UL
RBC # FLD: 12.7 %
SODIUM SERPL-SCNC: 142 MMOL/L
TESTOST SERPL-MCNC: 942 NG/DL
WBC # FLD AUTO: 6.74 K/UL

## 2022-08-15 ENCOUNTER — TRANSCRIPTION ENCOUNTER (OUTPATIENT)
Age: 22
End: 2022-08-15

## 2022-08-16 ENCOUNTER — NON-APPOINTMENT (OUTPATIENT)
Age: 22
End: 2022-08-16

## 2022-08-18 ENCOUNTER — APPOINTMENT (OUTPATIENT)
Dept: TRANSGENDER CARE | Facility: CLINIC | Age: 22
End: 2022-08-18

## 2022-08-18 VITALS
DIASTOLIC BLOOD PRESSURE: 82 MMHG | OXYGEN SATURATION: 98 % | HEART RATE: 73 BPM | HEIGHT: 65 IN | BODY MASS INDEX: 31.65 KG/M2 | SYSTOLIC BLOOD PRESSURE: 118 MMHG | TEMPERATURE: 97.6 F | WEIGHT: 190 LBS

## 2022-08-18 DIAGNOSIS — Z11.1 ENCOUNTER FOR SCREENING FOR RESPIRATORY TUBERCULOSIS: ICD-10-CM

## 2022-08-18 DIAGNOSIS — Z02.0 ENCOUNTER FOR EXAMINATION FOR ADMISSION TO EDUCATIONAL INSTITUTION: ICD-10-CM

## 2022-08-18 PROCEDURE — 36415 COLL VENOUS BLD VENIPUNCTURE: CPT

## 2022-08-18 PROCEDURE — 99213 OFFICE O/P EST LOW 20 MIN: CPT | Mod: 25

## 2022-08-29 ENCOUNTER — TRANSCRIPTION ENCOUNTER (OUTPATIENT)
Age: 22
End: 2022-08-29

## 2022-08-29 PROBLEM — Z02.0 SCHOOL HEALTH EXAMINATION: Status: ACTIVE | Noted: 2022-08-29

## 2022-08-29 LAB
M TB IFN-G BLD-IMP: ABNORMAL
QUANTIFERON TB PLUS MITOGEN MINUS NIL: 0.47 IU/ML
QUANTIFERON TB PLUS NIL: 0.24 IU/ML
QUANTIFERON TB PLUS TB1 MINUS NIL: -0.01 IU/ML
QUANTIFERON TB PLUS TB2 MINUS NIL: -0.02 IU/ML

## 2022-08-29 NOTE — PHYSICAL EXAM
[No Acute Distress] : no acute distress [Well Nourished] : well nourished [Well Developed] : well developed [Normal Sclera/Conjunctiva] : normal sclera/conjunctiva [EOMI] : extraocular movements intact [Normal Outer Ear/Nose] : the outer ears and nose were normal in appearance [No Respiratory Distress] : no respiratory distress  [No Accessory Muscle Use] : no accessory muscle use [Normal Rate] : normal rate  [Regular Rhythm] : with a regular rhythm [Normal S1, S2] : normal S1 and S2 [No Edema] : there was no peripheral edema [No Extremity Clubbing/Cyanosis] : no extremity clubbing/cyanosis [Soft] : abdomen soft [Non Tender] : non-tender [Non-distended] : non-distended [Normal Bowel Sounds] : normal bowel sounds [No Joint Swelling] : no joint swelling [Grossly Normal Strength/Tone] : grossly normal strength/tone [Coordination Grossly Intact] : coordination grossly intact [Normal Gait] : normal gait [Normal Affect] : the affect was normal [Normal Insight/Judgement] : insight and judgment were intact

## 2022-09-25 NOTE — ASU PREOP CHECKLIST - PATIENT SENT TO
Emergent IV consult. Arrow Endurance midline placed in LUE brachial vessel with ultrasound guidance for administration of Amiodarone bolus. Second PIV inserted in right FA x 1 attempt with ultrasound guidance, brisk blood return, flushes easy. Nurses instructed to switch Amio to USG PIV in right FA once bolus complete. operating room

## 2022-09-29 ENCOUNTER — TRANSCRIPTION ENCOUNTER (OUTPATIENT)
Age: 22
End: 2022-09-29

## 2023-01-04 ENCOUNTER — APPOINTMENT (OUTPATIENT)
Dept: ENDOCRINOLOGY | Facility: CLINIC | Age: 23
End: 2023-01-04
Payer: COMMERCIAL

## 2023-01-04 VITALS
WEIGHT: 190 LBS | RESPIRATION RATE: 18 BRPM | HEART RATE: 87 BPM | DIASTOLIC BLOOD PRESSURE: 82 MMHG | BODY MASS INDEX: 31.65 KG/M2 | TEMPERATURE: 97.2 F | HEIGHT: 65 IN | SYSTOLIC BLOOD PRESSURE: 118 MMHG | OXYGEN SATURATION: 96 %

## 2023-01-04 LAB
BASOPHILS # BLD AUTO: 0.03 K/UL
BASOPHILS NFR BLD AUTO: 0.3 %
EOSINOPHIL # BLD AUTO: 0.08 K/UL
EOSINOPHIL NFR BLD AUTO: 0.8 %
HCT VFR BLD CALC: 46.8 %
HGB BLD-MCNC: 15.6 G/DL
IMM GRANULOCYTES NFR BLD AUTO: 0.4 %
LYMPHOCYTES # BLD AUTO: 1.73 K/UL
LYMPHOCYTES NFR BLD AUTO: 17.9 %
MAN DIFF?: NORMAL
MCHC RBC-ENTMCNC: 29.1 PG
MCHC RBC-ENTMCNC: 33.3 GM/DL
MCV RBC AUTO: 87.3 FL
MONOCYTES # BLD AUTO: 0.84 K/UL
MONOCYTES NFR BLD AUTO: 8.7 %
NEUTROPHILS # BLD AUTO: 6.96 K/UL
NEUTROPHILS NFR BLD AUTO: 71.9 %
PLATELET # BLD AUTO: 250 K/UL
RBC # BLD: 5.36 M/UL
RBC # FLD: 12.1 %
WBC # FLD AUTO: 9.68 K/UL

## 2023-01-04 PROCEDURE — 99214 OFFICE O/P EST MOD 30 MIN: CPT

## 2023-01-04 NOTE — DATA REVIEWED
[FreeTextEntry1] : Labs 7/2022:\par CMP normal\par Testosterone 942\par CBC normal\par \par Labs 4/2022:\par Testosterone 618\par Estradiol 33\par \par Chol 198\par HDL 46\par \par CMP normal \par CBC normal\par \par Labs 8/2021:\par Estradiol 26\par Testosterone 333

## 2023-01-04 NOTE — PHYSICAL EXAM
[Alert] : alert [Well Nourished] : well nourished [Healthy Appearance] : healthy appearance [No Acute Distress] : no acute distress [No Proptosis] : no proptosis [Normal Hearing] : hearing was normal [No Respiratory Distress] : no respiratory distress [No Accessory Muscle Use] : no accessory muscle use [Normal Rate and Effort] : normal respiratory rate and effort [Clear to Auscultation] : lungs were clear to auscultation bilaterally [Normal S1, S2] : normal S1 and S2 [Normal Rate] : heart rate was normal [Regular Rhythm] : with a regular rhythm [No Edema] : no peripheral edema [Normal Bowel Sounds] : normal bowel sounds [Not Tender] : non-tender [Not Distended] : not distended [Soft] : abdomen soft [No Stigmata of Cushings Syndrome] : no stigmata of Cushings Syndrome [No Clubbing, Cyanosis] : no clubbing  or cyanosis of the fingernails [No Involuntary Movements] : no involuntary movements were seen [Normal Strength/Tone] : muscle strength and tone were normal [No Motor Deficits] : the motor exam was normal [No Tremors] : no tremors [Oriented x3] : oriented to person, place, and time [Normal Affect] : the affect was normal [Normal Mood] : the mood was normal [Kyphosis] : no kyphosis present [Acanthosis Nigricans] : no acanthosis nigricans [de-identified] : b/l mastectomy scars healed

## 2023-01-04 NOTE — HISTORY OF PRESENT ILLNESS
[FreeTextEntry1] : Vicente is a now 22 year old transgender male who presents today for cross sex hormone treatment with testosterone who is s/p top surgery double incision 4/2018 by Dr. Le with revision 12/2018. Was binding with GC2B prior to top surgery. Also s/p NITA by Dr. Osorio 8/2021. He has been followed by psychiatrist Dr. Greenfield every month and therapist Ms. Samreen Corea. Seen initially by peds jimbo Gonzalez 7/5/2017 for a discussion he decided not to opt for hormone suppression treatment. He returned 1/10/18 after he was cleared to start cross sex hormone treatment. He was started on testosterone 100 mg every 2 weeks and dosage has been adjusted based on results. Now on 0.3ml of 200mg./ml every week last dose yesterday. Dose lowered to 0.3ml from 0.4 ml 7/2022 for trough too high. Injects in thigh. Has been consistent. Since starting hormones has noticed hair growth, bottom growth, amenorrhea, body fat redistribution, deeper voice. Happy with changes thus far. Starting July 2020 he had been having on and off issues with irregular spotting which has been distressing to Vciente. He tried progesterone and initially appeared to work, but it only decreased and persistently was an issue even with higher dosage of Provera and IUD, but unfortunately this did not helped with his vaginal bleeding, and on 5/4/2021 he saw Dr. Martins they noted that IUD was not in place and had to be taken out. Now s/p NITA with Dr. Savannah Osorio. Was not interested in egg freezing. \par No headaches, changes in vision, chest pain, SOB, palpitations, LE swelling, calf pain\par Nonsmoker\par No hx of thromboembolic disease or liver disorders. \par Has been taking voice lessons. \par

## 2023-01-04 NOTE — ASSESSMENT
[FreeTextEntry1] : 21 y/o trans male on masculinizing hormone therapy. Discussed hormone affirming treatment for masculinization with testosterone. Reviewed various formulations of testosterone. Pt. prefers injections. Continue with testosterone 0.6ml weekly of 100mg/ml. Last dose yesterday. Risks and benefits of testosterone described including polycythemia and liver effects. Reviewed goal testosterone level of 300-400 as trough and 700-800 as peak level. Mid-range ideally should be 500-600 for cis-gender mid male range. Will repeat levels today on current dose and adjust as needed. GYN follow-up recommended. No plans for reproductive assistance. Will needs age-appropriate cancer screening in the future such as mammogram, colonoscopy.\par \par \par Prep time with review of labs and interval progress notes and consultations \par Discussion with patient regarding hormone regimen with management plan, risks and benefits, treatment options and goals of care answering all patients questions and addressing all concerns \par Post-visit completion charting and review\par Total Time 30 min\par \par Specifically causes, evaluation, treatment options, risks, complications, and benefits of available therapies were discussed. Questions were answered.\par \par The submitted E/M billing level for this visit reflects the total time spent on the day of the visit including face-to-face time spent with the patient, non-face-to-face review of medical records and relevant information, documentation, and asynchronous communication with the patient after a visit via phone, email, or patient’s EHR portal after the visit. \par The medical records reviewed are either scanned into the chart or reviewed with the patient using a patient’s electronic medical records portal for patients with records not available to [facility] via electronic transmission platforms from other institutions and labs. \par Time spend counseling and performing coordination of care was also included in determining the appropriate EM billing level.\par \par I have reviewed and verified information regarding the chief complaint and history recorded by the ancillary staff and/or the patient. I have independently reviewed and interpreted tests performed by other physicians and facilities as necessary. \par \par I have discussed with the patient differential diagnosis, reason for auxiliary tests if ordered, risks, benefits, alternatives, and complications of each form of therapy were discussed. \par \par \par \par \par \par

## 2023-01-06 LAB
ALBUMIN SERPL ELPH-MCNC: 4.8 G/DL
ALP BLD-CCNC: 80 U/L
ALT SERPL-CCNC: 39 U/L
ANION GAP SERPL CALC-SCNC: 13 MMOL/L
AST SERPL-CCNC: 26 U/L
BILIRUB SERPL-MCNC: 0.5 MG/DL
BUN SERPL-MCNC: 12 MG/DL
CALCIUM SERPL-MCNC: 10.1 MG/DL
CHLORIDE SERPL-SCNC: 102 MMOL/L
CO2 SERPL-SCNC: 26 MMOL/L
CREAT SERPL-MCNC: 1.04 MG/DL
EGFR: 104 ML/MIN/1.73M2
GLUCOSE SERPL-MCNC: 93 MG/DL
POTASSIUM SERPL-SCNC: 4.2 MMOL/L
PROT SERPL-MCNC: 7.2 G/DL
SODIUM SERPL-SCNC: 141 MMOL/L
TESTOST SERPL-MCNC: 654 NG/DL

## 2023-06-07 ENCOUNTER — APPOINTMENT (OUTPATIENT)
Dept: ENDOCRINOLOGY | Facility: CLINIC | Age: 23
End: 2023-06-07
Payer: COMMERCIAL

## 2023-06-07 VITALS
HEIGHT: 65 IN | BODY MASS INDEX: 33.49 KG/M2 | WEIGHT: 201 LBS | DIASTOLIC BLOOD PRESSURE: 75 MMHG | OXYGEN SATURATION: 97 % | TEMPERATURE: 98 F | HEART RATE: 79 BPM | SYSTOLIC BLOOD PRESSURE: 114 MMHG

## 2023-06-07 PROCEDURE — 99214 OFFICE O/P EST MOD 30 MIN: CPT

## 2023-06-07 NOTE — REVIEW OF SYSTEMS
[Fatigue] : no fatigue [Recent Weight Gain (___ Lbs)] : recent weight gain: [unfilled] lbs [Recent Weight Loss (___ Lbs)] : no recent weight loss [Visual Field Defect] : no visual field defect [Dysphagia] : no dysphagia [Dysphonia] : no dysphonia [Chest Pain] : no chest pain [Palpitations] : no palpitations [Shortness Of Breath] : no shortness of breath [Nausea] : no nausea [Constipation] : no constipation [Vomiting] : no vomiting [Diarrhea] : no diarrhea [Polyuria] : no polyuria [Joint Pain] : no joint pain [Headaches] : no headaches [Tremors] : no tremors [Polydipsia] : no polydipsia [Cold Intolerance] : no cold intolerance [Heat Intolerance] : no heat intolerance [All other systems negative] : All other systems negative

## 2023-06-07 NOTE — DATA REVIEWED
[FreeTextEntry1] : Labs 1/2023:\par Testosterone 654\par CMP normal\par CBC normal\par \par Labs 7/2022:\par CMP normal\par Testosterone 942\par CBC normal\par \par Labs 4/2022:\par Testosterone 618\par Estradiol 33\par \par Chol 198\par HDL 46\par \par CMP normal \par CBC normal\par \par Labs 8/2021:\par Estradiol 26\par Testosterone 333

## 2023-06-07 NOTE — ASSESSMENT
[FreeTextEntry1] : 24 y/o trans male on masculinizing hormone therapy. Discussed hormone affirming treatment for masculinization with testosterone. Reviewed various formulations of testosterone. Pt. prefers injections. Continue with testosterone 0.3ml weekly of 200mg/ml. Last dose yesterday. Risks and benefits of testosterone described including polycythemia and liver effects. Reviewed goal testosterone level of 300-400 as trough and 700-800 as peak level. Mid-range ideally should be 500-600 for cis-gender mid male range. Will repeat levels today on current dose and adjust as needed. GYN follow-up recommended. No plans for reproductive assistance. Will needs age-appropriate cancer screening in the future such as mammogram, colonoscopy.\par \par \par Prep time with review of labs and interval progress notes and consultations \par Discussion with patient regarding hormone regimen with management plan, risks and benefits, treatment options and goals of care answering all patients questions and addressing all concerns \par Post-visit completion charting and review\par Total Time 30 min\par \par Specifically causes, evaluation, treatment options, risks, complications, and benefits of available therapies were discussed. Questions were answered.\par \par The submitted E/M billing level for this visit reflects the total time spent on the day of the visit including face-to-face time spent with the patient, non-face-to-face review of medical records and relevant information, documentation, and asynchronous communication with the patient after a visit via phone, email, or patient’s EHR portal after the visit. \par The medical records reviewed are either scanned into the chart or reviewed with the patient using a patient’s electronic medical records portal for patients with records not available to [facility] via electronic transmission platforms from other institutions and labs. \par Time spend counseling and performing coordination of care was also included in determining the appropriate EM billing level.\par \par I have reviewed and verified information regarding the chief complaint and history recorded by the ancillary staff and/or the patient. I have independently reviewed and interpreted tests performed by other physicians and facilities as necessary. \par \par I have discussed with the patient differential diagnosis, reason for auxiliary tests if ordered, risks, benefits, alternatives, and complications of each form of therapy were discussed. \par \par \par \par \par \par

## 2023-06-07 NOTE — PHYSICAL EXAM
[Alert] : alert [Well Nourished] : well nourished [Healthy Appearance] : healthy appearance [No Acute Distress] : no acute distress [No Proptosis] : no proptosis [Normal Hearing] : hearing was normal [No Respiratory Distress] : no respiratory distress [No Accessory Muscle Use] : no accessory muscle use [Normal Rate and Effort] : normal respiratory rate and effort [Clear to Auscultation] : lungs were clear to auscultation bilaterally [Normal S1, S2] : normal S1 and S2 [Normal Rate] : heart rate was normal [Regular Rhythm] : with a regular rhythm [No Edema] : no peripheral edema [Normal Bowel Sounds] : normal bowel sounds [Not Tender] : non-tender [Not Distended] : not distended [Soft] : abdomen soft [Kyphosis] : no kyphosis present [No Stigmata of Cushings Syndrome] : no stigmata of Cushings Syndrome [No Clubbing, Cyanosis] : no clubbing  or cyanosis of the fingernails [No Involuntary Movements] : no involuntary movements were seen [Normal Strength/Tone] : muscle strength and tone were normal [Acanthosis Nigricans] : no acanthosis nigricans [No Motor Deficits] : the motor exam was normal [No Tremors] : no tremors [Oriented x3] : oriented to person, place, and time [Normal Affect] : the affect was normal [Normal Mood] : the mood was normal [de-identified] : b/l mastectomy scars healed

## 2023-06-07 NOTE — HISTORY OF PRESENT ILLNESS
[FreeTextEntry1] : Vicente is a now 23 year old transgender male who presents today for cross sex hormone treatment with testosterone who is s/p top surgery double incision 4/2018 by Dr. Le with revision 12/2018. Was binding with GC2B prior to top surgery. Also s/p NITA by Dr. Osorio 8/2021. He has been followed by psychiatrist Dr. Greenfield every month and therapist Ms. Samreen Corea. Seen initially by peds jimbo Gonzalez 7/5/2017 for a discussion he decided not to opt for hormone suppression treatment. He returned 1/10/18 after he was cleared to start cross sex hormone treatment. He was started on testosterone 100 mg every 2 weeks and dosage has been adjusted based on results. Now on 0.3ml of 200mg./ml every week last dose yesterday. Dose lowered to 0.3ml from 0.4 ml 7/2022 for trough too high. Injects in thigh. Has been consistent. Since starting hormones has noticed hair growth, bottom growth, amenorrhea, body fat redistribution, deeper voice. Happy with changes thus far. Starting July 2020 he had been having on and off issues with irregular spotting which has been distressing to Vicente. He tried progesterone and initially appeared to work, but it only decreased and persistently was an issue even with higher dosage of Provera and IUD, but unfortunately this did not helped with his vaginal bleeding, and on 5/4/2021 he saw Dr. Martins they noted that IUD was not in place and had to be taken out. Now s/p NITA with Dr. Savannah Osorio. Was not interested in egg freezing. \par No headaches, changes in vision, chest pain, SOB, palpitations, LE swelling, calf pain\par Nonsmoker\par No hx of thromboembolic disease or liver disorders. \par Has been taking voice lessons. \par

## 2023-06-08 LAB — TESTOST SERPL-MCNC: 415 NG/DL

## 2023-06-19 ENCOUNTER — TRANSCRIPTION ENCOUNTER (OUTPATIENT)
Age: 23
End: 2023-06-19

## 2023-10-04 ENCOUNTER — TRANSCRIPTION ENCOUNTER (OUTPATIENT)
Age: 23
End: 2023-10-04

## 2023-12-06 ENCOUNTER — TRANSCRIPTION ENCOUNTER (OUTPATIENT)
Age: 23
End: 2023-12-06

## 2023-12-07 ENCOUNTER — TRANSCRIPTION ENCOUNTER (OUTPATIENT)
Age: 23
End: 2023-12-07

## 2024-01-10 ENCOUNTER — APPOINTMENT (OUTPATIENT)
Dept: ENDOCRINOLOGY | Facility: CLINIC | Age: 24
End: 2024-01-10
Payer: COMMERCIAL

## 2024-01-10 VITALS
SYSTOLIC BLOOD PRESSURE: 110 MMHG | BODY MASS INDEX: 34.49 KG/M2 | DIASTOLIC BLOOD PRESSURE: 71 MMHG | OXYGEN SATURATION: 97 % | RESPIRATION RATE: 17 BRPM | TEMPERATURE: 97.6 F | HEIGHT: 65 IN | HEART RATE: 86 BPM | WEIGHT: 207 LBS

## 2024-01-10 DIAGNOSIS — F64.0 TRANSSEXUALISM: ICD-10-CM

## 2024-01-10 PROCEDURE — 99214 OFFICE O/P EST MOD 30 MIN: CPT

## 2024-01-10 PROCEDURE — G2211 COMPLEX E/M VISIT ADD ON: CPT

## 2024-01-10 NOTE — REVIEW OF SYSTEMS
[Recent Weight Gain (___ Lbs)] : recent weight gain: [unfilled] lbs [All other systems negative] : All other systems negative [Fatigue] : no fatigue [Recent Weight Loss (___ Lbs)] : no recent weight loss [Visual Field Defect] : no visual field defect [Dysphagia] : no dysphagia [Dysphonia] : no dysphonia [Chest Pain] : no chest pain [Palpitations] : no palpitations [Shortness Of Breath] : no shortness of breath [Nausea] : no nausea [Constipation] : no constipation [Vomiting] : no vomiting [Diarrhea] : no diarrhea [Polyuria] : no polyuria [Joint Pain] : no joint pain [Headaches] : no headaches [Tremors] : no tremors [Polydipsia] : no polydipsia [Cold Intolerance] : no cold intolerance [Heat Intolerance] : no heat intolerance

## 2024-01-10 NOTE — ASSESSMENT
[FreeTextEntry1] : 25 y/o trans male on masculinizing hormone therapy. Discussed hormone affirming treatment for masculinization with testosterone. Reviewed various formulations of testosterone. Pt. prefers injections. Continue with testosterone 0.3ml weekly of 200mg/ml. Last dose yesterday. Risks and benefits of testosterone described including polycythemia and liver effects. Reviewed goal testosterone level of 300-400 as trough and 700-800 as peak level. Mid-range ideally should be 500-600 for cis-gender mid male range. Will repeat levels today on current dose and adjust as needed. No plans for reproductive assistance. Will needs age-appropriate cancer screening in the future such as mammogram, colonoscopy.   Prep time with review of labs and interval progress notes and consultations  Discussion with patient regarding hormone regimen with management plan, risks and benefits, treatment options and goals of care answering all patients questions and addressing all concerns  Post-visit completion charting and review  Total Time 30 min    Specifically causes, evaluation, treatment options, risks, complications, and benefits of available therapies were discussed. Questions were answered.    The submitted E/M billing level for this visit reflects the total time spent on the day of the visit including face-to-face time spent with the patient, non-face-to-face review of medical records and relevant information, documentation, and asynchronous communication with the patient after a visit via phone, email, or patient's EHR portal after the visit.  The medical records reviewed are either scanned into the chart or reviewed with the patient using a patient's electronic medical records portal for patients with records not available to [facility] via electronic transmission platforms from other institutions and labs.  Time spend counseling and performing coordination of care was also included in determining the appropriate EM billing level.    I have reviewed and verified information regarding the chief complaint and history recorded by the ancillary staff and/or the patient. I have independently reviewed and interpreted tests performed by other physicians and facilities as necessary.    I have discussed with the patient differential diagnosis, reason for auxiliary tests if ordered, risks, benefits, alternatives, and complications of each form of therapy were discussed.

## 2024-01-10 NOTE — PHYSICAL EXAM
[Alert] : alert [Well Nourished] : well nourished [Healthy Appearance] : healthy appearance [No Acute Distress] : no acute distress [No Proptosis] : no proptosis [Normal Hearing] : hearing was normal [No Respiratory Distress] : no respiratory distress [No Accessory Muscle Use] : no accessory muscle use [Normal Rate and Effort] : normal respiratory rate and effort [Clear to Auscultation] : lungs were clear to auscultation bilaterally [Normal S1, S2] : normal S1 and S2 [Normal Rate] : heart rate was normal [Regular Rhythm] : with a regular rhythm [No Edema] : no peripheral edema [Normal Bowel Sounds] : normal bowel sounds [Not Tender] : non-tender [Not Distended] : not distended [Soft] : abdomen soft [No Stigmata of Cushings Syndrome] : no stigmata of Cushings Syndrome [No Clubbing, Cyanosis] : no clubbing  or cyanosis of the fingernails [No Involuntary Movements] : no involuntary movements were seen [Normal Strength/Tone] : muscle strength and tone were normal [No Motor Deficits] : the motor exam was normal [No Tremors] : no tremors [Oriented x3] : oriented to person, place, and time [Normal Affect] : the affect was normal [Normal Mood] : the mood was normal [Kyphosis] : no kyphosis present [Acanthosis Nigricans] : no acanthosis nigricans [de-identified] : b/l mastectomy scars healed

## 2024-01-10 NOTE — HISTORY OF PRESENT ILLNESS
[FreeTextEntry1] : Vicente is a now 24 year old transgender male who presents today for cross sex hormone treatment with testosterone who is s/p top surgery double incision 4/2018 by Dr. Le with revision 12/2018. Was binding with GC2B prior to top surgery. Also s/p NITA by Dr. Osorio 8/2021. He has been followed by psychiatrist Dr. Greenfield every month and therapist Ms. Sarmeen Corea. Seen initially by peds jimbo Gonzalez 7/5/2017 for a discussion he decided not to opt for hormone suppression treatment. He returned 1/10/18 after he was cleared to start cross sex hormone treatment. He was started on testosterone 100 mg every 2 weeks and dosage has been adjusted based on results. Now on 0.3ml of 200mg/ml every week last dose yesterday. Dose lowered to 0.3ml from 0.4 ml 7/2022 for trough too high. Injects in thigh. Has been consistent. Since starting hormones has noticed hair growth, bottom growth, amenorrhea, body fat redistribution, deeper voice. Happy with changes thus far. Starting July 2020 he had been having on and off issues with irregular spotting which has been distressing to Vicente. He tried progesterone and initially appeared to work, but it only decreased and persistently was an issue even with higher dosage of Provera and IUD, but unfortunately this did not helped with his vaginal bleeding, and on 5/4/2021 he saw Dr. Martins they noted that IUD was not in place and had to be taken out. Now s/p NITA with Dr. Savannah Osorio. Was not interested in egg freezing.  No headaches, changes in vision, chest pain, SOB, palpitations, LE swelling, calf pain Nonsmoker No hx of thromboembolic disease or liver disorders.  Has been taking voice lessons.

## 2024-01-10 NOTE — DATA REVIEWED
[FreeTextEntry1] : Labs 7/2023: Testosterone 415 Hb 16.7  Labs 1/2023: Testosterone 654 CMP normal CBC normal  Labs 7/2022: CMP normal Testosterone 942 CBC normal  Labs 4/2022: Testosterone 618 Estradiol 33  Chol 198 HDL 46  CMP normal  CBC normal  Labs 8/2021: Estradiol 26 Testosterone 333

## 2024-01-17 ENCOUNTER — TRANSCRIPTION ENCOUNTER (OUTPATIENT)
Age: 24
End: 2024-01-17

## 2024-01-17 LAB
ALBUMIN SERPL ELPH-MCNC: 4.8 G/DL
ALP BLD-CCNC: 83 U/L
ALT SERPL-CCNC: 24 U/L
ANION GAP SERPL CALC-SCNC: 11 MMOL/L
AST SERPL-CCNC: 22 U/L
BASOPHILS # BLD AUTO: 0.02 K/UL
BASOPHILS NFR BLD AUTO: 0.3 %
BILIRUB SERPL-MCNC: 0.6 MG/DL
BUN SERPL-MCNC: 11 MG/DL
CALCIUM SERPL-MCNC: 9.7 MG/DL
CHLORIDE SERPL-SCNC: 99 MMOL/L
CHOLEST SERPL-MCNC: 227 MG/DL
CO2 SERPL-SCNC: 27 MMOL/L
CREAT SERPL-MCNC: 1.08 MG/DL
EGFR: 98 ML/MIN/1.73M2
EOSINOPHIL # BLD AUTO: 0.08 K/UL
EOSINOPHIL NFR BLD AUTO: 1.3 %
ESTIMATED AVERAGE GLUCOSE: 111 MG/DL
GLUCOSE SERPL-MCNC: 90 MG/DL
HBA1C MFR BLD HPLC: 5.5 %
HCT VFR BLD CALC: 48.8 %
HDLC SERPL-MCNC: 46 MG/DL
HGB BLD-MCNC: 15.6 G/DL
IMM GRANULOCYTES NFR BLD AUTO: 0.3 %
LDLC SERPL CALC-MCNC: 141 MG/DL
LYMPHOCYTES # BLD AUTO: 1.76 K/UL
LYMPHOCYTES NFR BLD AUTO: 28.4 %
MAN DIFF?: NORMAL
MCHC RBC-ENTMCNC: 29.3 PG
MCHC RBC-ENTMCNC: 32 GM/DL
MCV RBC AUTO: 91.6 FL
MONOCYTES # BLD AUTO: 0.67 K/UL
MONOCYTES NFR BLD AUTO: 10.8 %
NEUTROPHILS # BLD AUTO: 3.65 K/UL
NEUTROPHILS NFR BLD AUTO: 58.9 %
NONHDLC SERPL-MCNC: 181 MG/DL
PLATELET # BLD AUTO: 260 K/UL
POTASSIUM SERPL-SCNC: 4 MMOL/L
PROT SERPL-MCNC: 7.2 G/DL
RBC # BLD: 5.33 M/UL
RBC # FLD: 12.2 %
SODIUM SERPL-SCNC: 138 MMOL/L
T4 FREE SERPL-MCNC: 1.4 NG/DL
TESTOST SERPL-MCNC: 287 NG/DL
TRIGL SERPL-MCNC: 224 MG/DL
TSH SERPL-ACNC: 0.9 UIU/ML
WBC # FLD AUTO: 6.2 K/UL

## 2024-02-13 ENCOUNTER — TRANSCRIPTION ENCOUNTER (OUTPATIENT)
Age: 24
End: 2024-02-13

## 2024-02-14 RX ORDER — TESTOSTERONE CYPIONATE 200 MG/ML
200 INJECTION, SOLUTION INTRAMUSCULAR
Qty: 3 | Refills: 0 | Status: ACTIVE | COMMUNITY
Start: 2018-01-24

## 2024-07-16 ENCOUNTER — APPOINTMENT (OUTPATIENT)
Dept: ENDOCRINOLOGY | Facility: CLINIC | Age: 24
End: 2024-07-16
Payer: COMMERCIAL

## 2024-07-16 VITALS
SYSTOLIC BLOOD PRESSURE: 144 MMHG | WEIGHT: 214 LBS | DIASTOLIC BLOOD PRESSURE: 80 MMHG | OXYGEN SATURATION: 99 % | HEART RATE: 89 BPM

## 2024-07-16 DIAGNOSIS — F64.0 TRANSSEXUALISM: ICD-10-CM

## 2024-07-16 PROCEDURE — 99214 OFFICE O/P EST MOD 30 MIN: CPT

## 2024-07-22 LAB
ALBUMIN SERPL ELPH-MCNC: 4.8 G/DL
ALP BLD-CCNC: 74 U/L
ALT SERPL-CCNC: 19 U/L
ANION GAP SERPL CALC-SCNC: 13 MMOL/L
AST SERPL-CCNC: 21 U/L
BASOPHILS # BLD AUTO: 0.01 K/UL
BASOPHILS NFR BLD AUTO: 0.1 %
BILIRUB SERPL-MCNC: 0.6 MG/DL
BUN SERPL-MCNC: 18 MG/DL
CALCIUM SERPL-MCNC: 9.8 MG/DL
CHLORIDE SERPL-SCNC: 103 MMOL/L
CO2 SERPL-SCNC: 25 MMOL/L
CREAT SERPL-MCNC: 1.12 MG/DL
EGFR: 94 ML/MIN/1.73M2
EOSINOPHIL # BLD AUTO: 0.1 K/UL
EOSINOPHIL NFR BLD AUTO: 1.4 %
GLUCOSE SERPL-MCNC: 102 MG/DL
HCT VFR BLD CALC: 49.6 %
HGB BLD-MCNC: 16.3 G/DL
IMM GRANULOCYTES NFR BLD AUTO: 0.1 %
LYMPHOCYTES # BLD AUTO: 1.88 K/UL
LYMPHOCYTES NFR BLD AUTO: 25.5 %
MAN DIFF?: NORMAL
MCHC RBC-ENTMCNC: 29.4 PG
MCHC RBC-ENTMCNC: 32.9 GM/DL
MCV RBC AUTO: 89.4 FL
MONOCYTES # BLD AUTO: 0.71 K/UL
MONOCYTES NFR BLD AUTO: 9.6 %
NEUTROPHILS # BLD AUTO: 4.65 K/UL
NEUTROPHILS NFR BLD AUTO: 63.3 %
PLATELET # BLD AUTO: 319 K/UL
POTASSIUM SERPL-SCNC: 4.2 MMOL/L
PROT SERPL-MCNC: 7.4 G/DL
RBC # BLD: 5.55 M/UL
RBC # FLD: 12.1 %
SODIUM SERPL-SCNC: 141 MMOL/L
TESTOST SERPL-MCNC: 433 NG/DL
WBC # FLD AUTO: 7.36 K/UL

## 2024-08-28 ENCOUNTER — TRANSCRIPTION ENCOUNTER (OUTPATIENT)
Age: 24
End: 2024-08-28

## 2024-09-03 ENCOUNTER — NON-APPOINTMENT (OUTPATIENT)
Age: 24
End: 2024-09-03

## 2024-09-04 ENCOUNTER — APPOINTMENT (OUTPATIENT)
Dept: TRANSGENDER CARE | Facility: CLINIC | Age: 24
End: 2024-09-04
Payer: COMMERCIAL

## 2024-09-04 VITALS
WEIGHT: 200 LBS | DIASTOLIC BLOOD PRESSURE: 82 MMHG | HEIGHT: 65 IN | TEMPERATURE: 98.3 F | BODY MASS INDEX: 33.32 KG/M2 | SYSTOLIC BLOOD PRESSURE: 132 MMHG | HEART RATE: 99 BPM | OXYGEN SATURATION: 98 %

## 2024-09-04 VITALS — SYSTOLIC BLOOD PRESSURE: 116 MMHG | DIASTOLIC BLOOD PRESSURE: 84 MMHG

## 2024-09-04 DIAGNOSIS — Z00.00 ENCOUNTER FOR GENERAL ADULT MEDICAL EXAMINATION W/OUT ABNORMAL FINDINGS: ICD-10-CM

## 2024-09-04 DIAGNOSIS — Z80.3 FAMILY HISTORY OF MALIGNANT NEOPLASM OF BREAST: ICD-10-CM

## 2024-09-04 DIAGNOSIS — F64.0 TRANSSEXUALISM: ICD-10-CM

## 2024-09-04 DIAGNOSIS — Z80.8 FAMILY HISTORY OF MALIGNANT NEOPLASM OF OTHER ORGANS OR SYSTEMS: ICD-10-CM

## 2024-09-04 DIAGNOSIS — Z30.431 ENCOUNTER FOR ROUTINE CHECKING OF INTRAUTERINE CONTRACEPTIVE DEVICE: ICD-10-CM

## 2024-09-04 DIAGNOSIS — Z30.430 ENCOUNTER FOR INSERTION OF INTRAUTERINE CONTRACEPTIVE DEVICE: ICD-10-CM

## 2024-09-04 DIAGNOSIS — F41.9 ANXIETY DISORDER, UNSPECIFIED: ICD-10-CM

## 2024-09-04 DIAGNOSIS — Z80.0 FAMILY HISTORY OF MALIGNANT NEOPLASM OF DIGESTIVE ORGANS: ICD-10-CM

## 2024-09-04 DIAGNOSIS — F32.A DEPRESSION, UNSPECIFIED: ICD-10-CM

## 2024-09-04 DIAGNOSIS — Z30.09 ENCOUNTER FOR OTHER GENERAL COUNSELING AND ADVICE ON CONTRACEPTION: ICD-10-CM

## 2024-09-04 DIAGNOSIS — Z82.49 FAMILY HISTORY OF ISCHEMIC HEART DISEASE AND OTHER DISEASES OF THE CIRCULATORY SYSTEM: ICD-10-CM

## 2024-09-04 DIAGNOSIS — Z87.42 PERSONAL HISTORY OF OTHER DISEASES OF THE FEMALE GENITAL TRACT: ICD-10-CM

## 2024-09-04 DIAGNOSIS — Z01.419 ENCOUNTER FOR GYNECOLOGICAL EXAMINATION (GENERAL) (ROUTINE) W/OUT ABNORMAL FINDINGS: ICD-10-CM

## 2024-09-04 DIAGNOSIS — Z23 ENCOUNTER FOR IMMUNIZATION: ICD-10-CM

## 2024-09-04 DIAGNOSIS — Z92.29 PERSONAL HISTORY OF OTHER DRUG THERAPY: ICD-10-CM

## 2024-09-04 DIAGNOSIS — T83.9XXA UNSPECIFIED COMPLICATION OF GENITOURINARY PROSTHETIC DEVICE, IMPLANT AND GRAFT, INITIAL ENCOUNTER: ICD-10-CM

## 2024-09-04 PROCEDURE — 99215 OFFICE O/P EST HI 40 MIN: CPT

## 2024-09-04 PROCEDURE — G2211 COMPLEX E/M VISIT ADD ON: CPT | Mod: NC

## 2024-09-04 RX ORDER — FLUOXETINE HYDROCHLORIDE 20 MG/1
20 CAPSULE ORAL
Qty: 90 | Refills: 0 | Status: ACTIVE | COMMUNITY
Start: 2024-09-04

## 2024-09-04 RX ORDER — FLUOXETINE HYDROCHLORIDE 10 MG/1
10 CAPSULE ORAL
Qty: 90 | Refills: 1 | Status: ACTIVE | COMMUNITY
Start: 2024-09-04

## 2024-09-04 NOTE — HISTORY OF PRESENT ILLNESS
[FreeTextEntry1] : cpe, establish PCP [de-identified] : Patient here today for CPE  Patient looking for yearly check up, has been doing well. He has no medical issues. Has had covid vax 2 vaccines, boosters in between then.   Gender Dysphoria: patient started testosterone 1/10/18, currently follow dr. silverman. He had hysterectomy removing uterus/cervix 8/2021, and top surgery 4/2018. 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 Patient denies any thoughts of hurting self or anyone else  - stable on current regimen  Sexual Hx: Relationship status: single - previously sexually active  Partners (tell me about the genders and bodies of partners, any sperm producing partners): trans male Practices (types of sex, monogamous/polyamorous): oral, front hole  Protections from STIs (condoms, OCP, LARC, PrEP, etc.): condoms with toys  Past Hx of STIs: none  Pregnancy (Desire or Hx): none  LMP: 2021 Pap: N/A, prior to hysto normal   Patient reports 0 sexual partners in the last 3 months. Last sexual encounter was 7 months ago  Pt denies any SOB, cough, chest pain, palpitations, N/V/D/C, fever, or chills. No other health concerns today.

## 2024-09-04 NOTE — PLAN
[FreeTextEntry1] : Gender Dysphoria: stable, continue care under endo. Advised patient to call with any questions or concerns. Patient verbalized understanding.   Anxiety/Depression: stable, patient denies any thoughts of hurting self or anyone else at time of visit. Continue care under psychiatrist / therapist. Advised patient to call with any questions or concerns. Patient verbalized understanding.   HCM: stable at time of visit, no concerns. Will order routine blood work and f/u results to patient once made available. Advised patient to call with any questions or concerns. Patient verbalized understanding.

## 2024-11-21 NOTE — HISTORY OF PRESENT ILLNESS
[FreeTextEntry8] : Patient here for school physical.\par Vaccines reviewed. \par Needs tetanus booster and TB testing.\par Feels well. No concerns. No recent illness. 
55

## 2025-01-08 ENCOUNTER — APPOINTMENT (OUTPATIENT)
Dept: ENDOCRINOLOGY | Facility: CLINIC | Age: 25
End: 2025-01-08
Payer: COMMERCIAL

## 2025-01-08 VITALS
SYSTOLIC BLOOD PRESSURE: 129 MMHG | WEIGHT: 196 LBS | TEMPERATURE: 97.9 F | DIASTOLIC BLOOD PRESSURE: 89 MMHG | HEIGHT: 65 IN | OXYGEN SATURATION: 97 % | HEART RATE: 91 BPM | BODY MASS INDEX: 32.65 KG/M2 | RESPIRATION RATE: 18 BRPM

## 2025-01-08 DIAGNOSIS — F64.0 TRANSSEXUALISM: ICD-10-CM

## 2025-01-08 PROCEDURE — 99214 OFFICE O/P EST MOD 30 MIN: CPT

## 2025-01-14 LAB
ALBUMIN SERPL ELPH-MCNC: 5.2 G/DL
ALP BLD-CCNC: 87 U/L
ALT SERPL-CCNC: 37 U/L
ANION GAP SERPL CALC-SCNC: 16 MMOL/L
AST SERPL-CCNC: 24 U/L
BASOPHILS # BLD AUTO: 0.02 K/UL
BASOPHILS NFR BLD AUTO: 0.3 %
BILIRUB SERPL-MCNC: 0.6 MG/DL
BUN SERPL-MCNC: 12 MG/DL
CALCIUM SERPL-MCNC: 10.4 MG/DL
CHLORIDE SERPL-SCNC: 97 MMOL/L
CHOLEST SERPL-MCNC: 274 MG/DL
CO2 SERPL-SCNC: 26 MMOL/L
CREAT SERPL-MCNC: 1.03 MG/DL
EGFR: 103 ML/MIN/1.73M2
EOSINOPHIL # BLD AUTO: 0.04 K/UL
EOSINOPHIL NFR BLD AUTO: 0.6 %
ESTIMATED AVERAGE GLUCOSE: 108 MG/DL
GLUCOSE SERPL-MCNC: 89 MG/DL
HBA1C MFR BLD HPLC: 5.4 %
HCT VFR BLD CALC: 50.3 %
HDLC SERPL-MCNC: 56 MG/DL
HGB BLD-MCNC: 16.5 G/DL
IMM GRANULOCYTES NFR BLD AUTO: 0.4 %
LDLC SERPL CALC-MCNC: 172 MG/DL
LYMPHOCYTES # BLD AUTO: 1.82 K/UL
LYMPHOCYTES NFR BLD AUTO: 26.3 %
MAN DIFF?: NORMAL
MCHC RBC-ENTMCNC: 29.9 PG
MCHC RBC-ENTMCNC: 32.8 G/DL
MCV RBC AUTO: 91.1 FL
MONOCYTES # BLD AUTO: 0.57 K/UL
MONOCYTES NFR BLD AUTO: 8.2 %
NEUTROPHILS # BLD AUTO: 4.44 K/UL
NEUTROPHILS NFR BLD AUTO: 64.2 %
NONHDLC SERPL-MCNC: 218 MG/DL
PLATELET # BLD AUTO: 313 K/UL
POTASSIUM SERPL-SCNC: 3.9 MMOL/L
PROT SERPL-MCNC: 7.9 G/DL
RBC # BLD: 5.52 M/UL
RBC # FLD: 12.3 %
SODIUM SERPL-SCNC: 139 MMOL/L
T4 FREE SERPL-MCNC: 1.3 NG/DL
TESTOST SERPL-MCNC: 429 NG/DL
TRIGL SERPL-MCNC: 248 MG/DL
TSH SERPL-ACNC: 0.81 UIU/ML
WBC # FLD AUTO: 6.92 K/UL

## 2025-01-23 ENCOUNTER — TRANSCRIPTION ENCOUNTER (OUTPATIENT)
Age: 25
End: 2025-01-23

## 2025-01-29 LAB
M TB IFN-G BLD-IMP: NEGATIVE
QUANTIFERON TB PLUS MITOGEN MINUS NIL: >10 IU/ML
QUANTIFERON TB PLUS NIL: 0.02 IU/ML
QUANTIFERON TB PLUS TB1 MINUS NIL: 0 IU/ML
QUANTIFERON TB PLUS TB2 MINUS NIL: 0 IU/ML

## 2025-06-10 ENCOUNTER — TRANSCRIPTION ENCOUNTER (OUTPATIENT)
Age: 25
End: 2025-06-10

## 2025-07-17 ENCOUNTER — TRANSCRIPTION ENCOUNTER (OUTPATIENT)
Age: 25
End: 2025-07-17

## 2025-07-18 ENCOUNTER — NON-APPOINTMENT (OUTPATIENT)
Age: 25
End: 2025-07-18

## 2025-07-21 LAB
ALBUMIN SERPL ELPH-MCNC: 5.1 G/DL
ALP BLD-CCNC: 82 U/L
ALT SERPL-CCNC: 24 U/L
ANION GAP SERPL CALC-SCNC: 17 MMOL/L
AST SERPL-CCNC: 21 U/L
BASOPHILS # BLD AUTO: 0.02 K/UL
BASOPHILS NFR BLD AUTO: 0.2 %
BILIRUB SERPL-MCNC: 0.8 MG/DL
BUN SERPL-MCNC: 10 MG/DL
CALCIUM SERPL-MCNC: 10 MG/DL
CHLORIDE SERPL-SCNC: 99 MMOL/L
CHOLEST SERPL-MCNC: 231 MG/DL
CO2 SERPL-SCNC: 23 MMOL/L
CREAT SERPL-MCNC: 1.36 MG/DL
EGFRCR SERPLBLD CKD-EPI 2021: 74 ML/MIN/1.73M2
EOSINOPHIL # BLD AUTO: 0.07 K/UL
EOSINOPHIL NFR BLD AUTO: 0.7 %
ESTIMATED AVERAGE GLUCOSE: 114 MG/DL
GLUCOSE SERPL-MCNC: 121 MG/DL
HBA1C MFR BLD HPLC: 5.6 %
HCT VFR BLD CALC: 46.3 %
HDLC SERPL-MCNC: 50 MG/DL
HGB BLD-MCNC: 15.4 G/DL
IMM GRANULOCYTES NFR BLD AUTO: 0.2 %
LDLC SERPL-MCNC: 155 MG/DL
LYMPHOCYTES # BLD AUTO: 1.71 K/UL
LYMPHOCYTES NFR BLD AUTO: 17.3 %
MAN DIFF?: NORMAL
MCHC RBC-ENTMCNC: 29.9 PG
MCHC RBC-ENTMCNC: 33.3 G/DL
MCV RBC AUTO: 89.9 FL
MONOCYTES # BLD AUTO: 0.96 K/UL
MONOCYTES NFR BLD AUTO: 9.7 %
NEUTROPHILS # BLD AUTO: 7.13 K/UL
NEUTROPHILS NFR BLD AUTO: 71.9 %
NONHDLC SERPL-MCNC: 181 MG/DL
PLATELET # BLD AUTO: 277 K/UL
POTASSIUM SERPL-SCNC: 3.5 MMOL/L
PROT SERPL-MCNC: 7.5 G/DL
RBC # BLD: 5.15 M/UL
RBC # FLD: 11.9 %
SODIUM SERPL-SCNC: 139 MMOL/L
T4 FREE SERPL-MCNC: 1.3 NG/DL
TESTOST SERPL-MCNC: 317 NG/DL
TRIGL SERPL-MCNC: 143 MG/DL
TSH SERPL-ACNC: 1.19 UIU/ML
WBC # FLD AUTO: 9.91 K/UL

## 2025-07-23 ENCOUNTER — APPOINTMENT (OUTPATIENT)
Dept: ENDOCRINOLOGY | Facility: CLINIC | Age: 25
End: 2025-07-23
Payer: COMMERCIAL

## 2025-07-23 VITALS
HEART RATE: 87 BPM | BODY MASS INDEX: 33.88 KG/M2 | HEIGHT: 65 IN | DIASTOLIC BLOOD PRESSURE: 76 MMHG | SYSTOLIC BLOOD PRESSURE: 123 MMHG | OXYGEN SATURATION: 96 % | WEIGHT: 203.38 LBS

## 2025-07-23 DIAGNOSIS — F64.0 TRANSSEXUALISM: ICD-10-CM

## 2025-07-23 PROCEDURE — G2211 COMPLEX E/M VISIT ADD ON: CPT | Mod: NC

## 2025-07-23 PROCEDURE — 99214 OFFICE O/P EST MOD 30 MIN: CPT

## 2025-08-08 ENCOUNTER — NON-APPOINTMENT (OUTPATIENT)
Age: 25
End: 2025-08-08